# Patient Record
Sex: FEMALE | Race: BLACK OR AFRICAN AMERICAN | Employment: OTHER | URBAN - METROPOLITAN AREA
[De-identification: names, ages, dates, MRNs, and addresses within clinical notes are randomized per-mention and may not be internally consistent; named-entity substitution may affect disease eponyms.]

---

## 2021-06-10 ENCOUNTER — APPOINTMENT (OUTPATIENT)
Dept: CT IMAGING | Age: 36
DRG: 897 | End: 2021-06-10
Payer: MEDICARE

## 2021-06-10 ENCOUNTER — HOSPITAL ENCOUNTER (INPATIENT)
Age: 36
LOS: 2 days | Discharge: PSYCHIATRIC OR SUBSTANCE USE FACILITY | DRG: 897 | End: 2021-06-12
Attending: EMERGENCY MEDICINE | Admitting: HOSPITALIST
Payer: MEDICARE

## 2021-06-10 ENCOUNTER — APPOINTMENT (OUTPATIENT)
Dept: GENERAL RADIOLOGY | Age: 36
DRG: 897 | End: 2021-06-10
Payer: MEDICARE

## 2021-06-10 DIAGNOSIS — F10.920 ACUTE ALCOHOLIC INTOXICATION WITHOUT COMPLICATION (HCC): Primary | ICD-10-CM

## 2021-06-10 DIAGNOSIS — F20.9 SCHIZOPHRENIA, UNSPECIFIED TYPE (HCC): ICD-10-CM

## 2021-06-10 DIAGNOSIS — N39.0 URINARY TRACT INFECTION WITHOUT HEMATURIA, SITE UNSPECIFIED: ICD-10-CM

## 2021-06-10 PROBLEM — F10.930 ALCOHOL WITHDRAWAL, UNCOMPLICATED (HCC): Status: ACTIVE | Noted: 2021-06-10

## 2021-06-10 LAB
A/G RATIO: 1.2 (ref 1.1–2.2)
ACETAMINOPHEN LEVEL: <5 UG/ML (ref 10–30)
ALBUMIN SERPL-MCNC: 4.1 G/DL (ref 3.4–5)
ALP BLD-CCNC: 67 U/L (ref 40–129)
ALT SERPL-CCNC: 13 U/L (ref 10–40)
AMPHETAMINE SCREEN, URINE: ABNORMAL
ANION GAP SERPL CALCULATED.3IONS-SCNC: 10 MMOL/L (ref 3–16)
AST SERPL-CCNC: 24 U/L (ref 15–37)
BARBITURATE SCREEN URINE: ABNORMAL
BASE EXCESS ARTERIAL: -1.5 MMOL/L (ref -3–3)
BASOPHILS ABSOLUTE: 0.1 K/UL (ref 0–0.2)
BASOPHILS RELATIVE PERCENT: 1 %
BENZODIAZEPINE SCREEN, URINE: ABNORMAL
BILIRUB SERPL-MCNC: <0.2 MG/DL (ref 0–1)
BILIRUBIN URINE: NEGATIVE
BLOOD, URINE: NEGATIVE
BUN BLDV-MCNC: 13 MG/DL (ref 7–20)
CALCIUM SERPL-MCNC: 8.9 MG/DL (ref 8.3–10.6)
CANNABINOID SCREEN URINE: POSITIVE
CARBOXYHEMOGLOBIN ARTERIAL: 1 % (ref 0–1.5)
CHLORIDE BLD-SCNC: 104 MMOL/L (ref 99–110)
CLARITY: ABNORMAL
CO2: 25 MMOL/L (ref 21–32)
COCAINE METABOLITE SCREEN URINE: ABNORMAL
COLOR: YELLOW
CREAT SERPL-MCNC: 0.8 MG/DL (ref 0.6–1.1)
EKG ATRIAL RATE: 98 BPM
EKG DIAGNOSIS: NORMAL
EKG P AXIS: 62 DEGREES
EKG P-R INTERVAL: 184 MS
EKG Q-T INTERVAL: 370 MS
EKG QRS DURATION: 84 MS
EKG QTC CALCULATION (BAZETT): 472 MS
EKG R AXIS: 52 DEGREES
EKG T AXIS: 52 DEGREES
EKG VENTRICULAR RATE: 98 BPM
EOSINOPHILS ABSOLUTE: 0.2 K/UL (ref 0–0.6)
EOSINOPHILS RELATIVE PERCENT: 3.6 %
EPITHELIAL CELLS, UA: 5 /HPF (ref 0–5)
ETHANOL: 101 MG/DL (ref 0–0.08)
ETHANOL: 170 MG/DL (ref 0–0.08)
ETHANOL: 273 MG/DL (ref 0–0.08)
ETHANOL: 45 MG/DL (ref 0–0.08)
GFR AFRICAN AMERICAN: >60
GFR NON-AFRICAN AMERICAN: >60
GLOBULIN: 3.4 G/DL
GLUCOSE BLD-MCNC: 106 MG/DL (ref 70–99)
GLUCOSE URINE: NEGATIVE MG/DL
HCG QUALITATIVE: NEGATIVE
HCO3 ARTERIAL: 24.2 MMOL/L (ref 21–29)
HCT VFR BLD CALC: 41.7 % (ref 36–48)
HEMOGLOBIN, ART, EXTENDED: 12.9 G/DL (ref 12–16)
HEMOGLOBIN: 13.9 G/DL (ref 12–16)
HYALINE CASTS: 2 /LPF (ref 0–8)
KETONES, URINE: NEGATIVE MG/DL
LEUKOCYTE ESTERASE, URINE: ABNORMAL
LYMPHOCYTES ABSOLUTE: 3.1 K/UL (ref 1–5.1)
LYMPHOCYTES RELATIVE PERCENT: 55.6 %
Lab: ABNORMAL
MCH RBC QN AUTO: 27.9 PG (ref 26–34)
MCHC RBC AUTO-ENTMCNC: 33.4 G/DL (ref 31–36)
MCV RBC AUTO: 83.5 FL (ref 80–100)
METHADONE SCREEN, URINE: ABNORMAL
METHEMOGLOBIN ARTERIAL: 0.3 %
MICROSCOPIC EXAMINATION: YES
MONOCYTES ABSOLUTE: 0.5 K/UL (ref 0–1.3)
MONOCYTES RELATIVE PERCENT: 8.2 %
NEUTROPHILS ABSOLUTE: 1.7 K/UL (ref 1.7–7.7)
NEUTROPHILS RELATIVE PERCENT: 31.6 %
NITRITE, URINE: NEGATIVE
O2 CONTENT ARTERIAL: 17 ML/DL
O2 SAT, ARTERIAL: 96.5 %
O2 THERAPY: NORMAL
OPIATE SCREEN URINE: ABNORMAL
OXYCODONE URINE: ABNORMAL
PCO2 ARTERIAL: 43.2 MMHG (ref 35–45)
PDW BLD-RTO: 14 % (ref 12.4–15.4)
PH ARTERIAL: 7.36 (ref 7.35–7.45)
PH UA: 5.5
PH UA: 5.5 (ref 5–8)
PHENCYCLIDINE SCREEN URINE: ABNORMAL
PLATELET # BLD: 236 K/UL (ref 135–450)
PMV BLD AUTO: 8.3 FL (ref 5–10.5)
PO2 ARTERIAL: 91.3 MMHG (ref 75–108)
POTASSIUM REFLEX MAGNESIUM: 4 MMOL/L (ref 3.5–5.1)
PROPOXYPHENE SCREEN: ABNORMAL
PROTEIN UA: NEGATIVE MG/DL
RBC # BLD: 4.99 M/UL (ref 4–5.2)
RBC UA: 1 /HPF (ref 0–4)
SALICYLATE, SERUM: <0.3 MG/DL (ref 15–30)
SARS-COV-2, NAAT: NOT DETECTED
SODIUM BLD-SCNC: 139 MMOL/L (ref 136–145)
SPECIFIC GRAVITY UA: 1.01 (ref 1–1.03)
TCO2 ARTERIAL: 57.1 MMOL/L
TOTAL PROTEIN: 7.5 G/DL (ref 6.4–8.2)
TROPONIN: <0.01 NG/ML
URINE REFLEX TO CULTURE: YES
URINE TYPE: ABNORMAL
UROBILINOGEN, URINE: 0.2 E.U./DL
WBC # BLD: 5.5 K/UL (ref 4–11)
WBC UA: 47 /HPF (ref 0–5)

## 2021-06-10 PROCEDURE — 85025 COMPLETE CBC W/AUTO DIFF WBC: CPT

## 2021-06-10 PROCEDURE — 6370000000 HC RX 637 (ALT 250 FOR IP): Performed by: EMERGENCY MEDICINE

## 2021-06-10 PROCEDURE — 80053 COMPREHEN METABOLIC PANEL: CPT

## 2021-06-10 PROCEDURE — 93005 ELECTROCARDIOGRAM TRACING: CPT | Performed by: PHYSICIAN ASSISTANT

## 2021-06-10 PROCEDURE — 84484 ASSAY OF TROPONIN QUANT: CPT

## 2021-06-10 PROCEDURE — 81001 URINALYSIS AUTO W/SCOPE: CPT

## 2021-06-10 PROCEDURE — 2580000003 HC RX 258: Performed by: EMERGENCY MEDICINE

## 2021-06-10 PROCEDURE — 87635 SARS-COV-2 COVID-19 AMP PRB: CPT

## 2021-06-10 PROCEDURE — 80143 DRUG ASSAY ACETAMINOPHEN: CPT

## 2021-06-10 PROCEDURE — 2500000003 HC RX 250 WO HCPCS: Performed by: EMERGENCY MEDICINE

## 2021-06-10 PROCEDURE — 82803 BLOOD GASES ANY COMBINATION: CPT

## 2021-06-10 PROCEDURE — 80179 DRUG ASSAY SALICYLATE: CPT

## 2021-06-10 PROCEDURE — 96366 THER/PROPH/DIAG IV INF ADDON: CPT

## 2021-06-10 PROCEDURE — 2060000000 HC ICU INTERMEDIATE R&B

## 2021-06-10 PROCEDURE — 6360000002 HC RX W HCPCS: Performed by: PHYSICIAN ASSISTANT

## 2021-06-10 PROCEDURE — 36600 WITHDRAWAL OF ARTERIAL BLOOD: CPT

## 2021-06-10 PROCEDURE — 6370000000 HC RX 637 (ALT 250 FOR IP): Performed by: STUDENT IN AN ORGANIZED HEALTH CARE EDUCATION/TRAINING PROGRAM

## 2021-06-10 PROCEDURE — 96365 THER/PROPH/DIAG IV INF INIT: CPT

## 2021-06-10 PROCEDURE — 36415 COLL VENOUS BLD VENIPUNCTURE: CPT

## 2021-06-10 PROCEDURE — 2500000003 HC RX 250 WO HCPCS: Performed by: PHYSICIAN ASSISTANT

## 2021-06-10 PROCEDURE — 70450 CT HEAD/BRAIN W/O DYE: CPT

## 2021-06-10 PROCEDURE — 80307 DRUG TEST PRSMV CHEM ANLYZR: CPT

## 2021-06-10 PROCEDURE — 84703 CHORIONIC GONADOTROPIN ASSAY: CPT

## 2021-06-10 PROCEDURE — 82077 ASSAY SPEC XCP UR&BREATH IA: CPT

## 2021-06-10 PROCEDURE — 6360000002 HC RX W HCPCS: Performed by: STUDENT IN AN ORGANIZED HEALTH CARE EDUCATION/TRAINING PROGRAM

## 2021-06-10 PROCEDURE — 99285 EMERGENCY DEPT VISIT HI MDM: CPT

## 2021-06-10 PROCEDURE — 93010 ELECTROCARDIOGRAM REPORT: CPT | Performed by: INTERNAL MEDICINE

## 2021-06-10 PROCEDURE — 71045 X-RAY EXAM CHEST 1 VIEW: CPT

## 2021-06-10 PROCEDURE — 87086 URINE CULTURE/COLONY COUNT: CPT

## 2021-06-10 PROCEDURE — 96375 TX/PRO/DX INJ NEW DRUG ADDON: CPT

## 2021-06-10 PROCEDURE — 6360000002 HC RX W HCPCS: Performed by: EMERGENCY MEDICINE

## 2021-06-10 PROCEDURE — 2580000003 HC RX 258: Performed by: PHYSICIAN ASSISTANT

## 2021-06-10 RX ORDER — IBUPROFEN 800 MG/1
800 TABLET ORAL EVERY 8 HOURS PRN
Status: DISCONTINUED | OUTPATIENT
Start: 2021-06-10 | End: 2021-06-12 | Stop reason: HOSPADM

## 2021-06-10 RX ORDER — SODIUM CHLORIDE 0.9 % (FLUSH) 0.9 %
5-40 SYRINGE (ML) INJECTION PRN
Status: DISCONTINUED | OUTPATIENT
Start: 2021-06-10 | End: 2021-06-12 | Stop reason: HOSPADM

## 2021-06-10 RX ORDER — LORAZEPAM 1 MG/1
4 TABLET ORAL
Status: DISCONTINUED | OUTPATIENT
Start: 2021-06-10 | End: 2021-06-12 | Stop reason: HOSPADM

## 2021-06-10 RX ORDER — CEPHALEXIN 500 MG/1
500 CAPSULE ORAL 4 TIMES DAILY
Qty: 12 CAPSULE | Refills: 0 | Status: SHIPPED | OUTPATIENT
Start: 2021-06-10 | End: 2021-06-12 | Stop reason: HOSPADM

## 2021-06-10 RX ORDER — PANTOPRAZOLE SODIUM 40 MG/1
40 TABLET, DELAYED RELEASE ORAL
Status: DISCONTINUED | OUTPATIENT
Start: 2021-06-11 | End: 2021-06-12 | Stop reason: HOSPADM

## 2021-06-10 RX ORDER — CEPHALEXIN 250 MG/1
500 CAPSULE ORAL ONCE
Status: COMPLETED | OUTPATIENT
Start: 2021-06-10 | End: 2021-06-10

## 2021-06-10 RX ORDER — LORAZEPAM 1 MG/1
1 TABLET ORAL
Status: DISCONTINUED | OUTPATIENT
Start: 2021-06-10 | End: 2021-06-12 | Stop reason: HOSPADM

## 2021-06-10 RX ORDER — LORAZEPAM 1 MG/1
2 TABLET ORAL
Status: DISCONTINUED | OUTPATIENT
Start: 2021-06-10 | End: 2021-06-12 | Stop reason: HOSPADM

## 2021-06-10 RX ORDER — HALOPERIDOL 5 MG/ML
10 INJECTION INTRAMUSCULAR ONCE
Status: DISCONTINUED | OUTPATIENT
Start: 2021-06-10 | End: 2021-06-10

## 2021-06-10 RX ORDER — ONDANSETRON 2 MG/ML
4 INJECTION INTRAMUSCULAR; INTRAVENOUS ONCE
Status: COMPLETED | OUTPATIENT
Start: 2021-06-10 | End: 2021-06-10

## 2021-06-10 RX ORDER — LORAZEPAM 2 MG/ML
2 INJECTION INTRAMUSCULAR ONCE
Status: COMPLETED | OUTPATIENT
Start: 2021-06-10 | End: 2021-06-10

## 2021-06-10 RX ORDER — LORAZEPAM 2 MG/ML
2 INJECTION INTRAMUSCULAR
Status: DISCONTINUED | OUTPATIENT
Start: 2021-06-10 | End: 2021-06-12 | Stop reason: HOSPADM

## 2021-06-10 RX ORDER — SODIUM CHLORIDE 0.9 % (FLUSH) 0.9 %
5-40 SYRINGE (ML) INJECTION EVERY 12 HOURS SCHEDULED
Status: DISCONTINUED | OUTPATIENT
Start: 2021-06-10 | End: 2021-06-12 | Stop reason: HOSPADM

## 2021-06-10 RX ORDER — ACETAMINOPHEN 325 MG/1
650 TABLET ORAL EVERY 6 HOURS PRN
Status: DISCONTINUED | OUTPATIENT
Start: 2021-06-10 | End: 2021-06-12 | Stop reason: HOSPADM

## 2021-06-10 RX ORDER — FERROUS SULFATE 325(65) MG
325 TABLET ORAL 2 TIMES DAILY
Status: DISCONTINUED | OUTPATIENT
Start: 2021-06-10 | End: 2021-06-12 | Stop reason: HOSPADM

## 2021-06-10 RX ORDER — SODIUM CHLORIDE 9 MG/ML
25 INJECTION, SOLUTION INTRAVENOUS PRN
Status: DISCONTINUED | OUTPATIENT
Start: 2021-06-10 | End: 2021-06-12 | Stop reason: HOSPADM

## 2021-06-10 RX ORDER — 0.9 % SODIUM CHLORIDE 0.9 %
1000 INTRAVENOUS SOLUTION INTRAVENOUS ONCE
Status: COMPLETED | OUTPATIENT
Start: 2021-06-10 | End: 2021-06-10

## 2021-06-10 RX ORDER — ONDANSETRON 2 MG/ML
4 INJECTION INTRAMUSCULAR; INTRAVENOUS EVERY 6 HOURS PRN
Status: DISCONTINUED | OUTPATIENT
Start: 2021-06-10 | End: 2021-06-12 | Stop reason: HOSPADM

## 2021-06-10 RX ORDER — PROCHLORPERAZINE EDISYLATE 5 MG/ML
10 INJECTION INTRAMUSCULAR; INTRAVENOUS ONCE
Status: COMPLETED | OUTPATIENT
Start: 2021-06-10 | End: 2021-06-10

## 2021-06-10 RX ORDER — ACETAMINOPHEN 325 MG/1
650 TABLET ORAL ONCE
Status: COMPLETED | OUTPATIENT
Start: 2021-06-10 | End: 2021-06-10

## 2021-06-10 RX ORDER — POLYETHYLENE GLYCOL 3350 17 G/17G
17 POWDER, FOR SOLUTION ORAL DAILY PRN
Status: DISCONTINUED | OUTPATIENT
Start: 2021-06-10 | End: 2021-06-12 | Stop reason: HOSPADM

## 2021-06-10 RX ORDER — ACETAMINOPHEN 650 MG/1
650 SUPPOSITORY RECTAL EVERY 6 HOURS PRN
Status: DISCONTINUED | OUTPATIENT
Start: 2021-06-10 | End: 2021-06-12 | Stop reason: HOSPADM

## 2021-06-10 RX ORDER — LORAZEPAM 2 MG/ML
3 INJECTION INTRAMUSCULAR
Status: DISCONTINUED | OUTPATIENT
Start: 2021-06-10 | End: 2021-06-12 | Stop reason: HOSPADM

## 2021-06-10 RX ORDER — LORAZEPAM 2 MG/ML
1 INJECTION INTRAMUSCULAR
Status: DISCONTINUED | OUTPATIENT
Start: 2021-06-10 | End: 2021-06-12 | Stop reason: HOSPADM

## 2021-06-10 RX ORDER — LORAZEPAM 2 MG/ML
4 INJECTION INTRAMUSCULAR
Status: DISCONTINUED | OUTPATIENT
Start: 2021-06-10 | End: 2021-06-12 | Stop reason: HOSPADM

## 2021-06-10 RX ORDER — KETOROLAC TROMETHAMINE 30 MG/ML
15 INJECTION, SOLUTION INTRAMUSCULAR; INTRAVENOUS EVERY 6 HOURS PRN
Status: DISCONTINUED | OUTPATIENT
Start: 2021-06-10 | End: 2021-06-12 | Stop reason: HOSPADM

## 2021-06-10 RX ORDER — ONDANSETRON 4 MG/1
4 TABLET, ORALLY DISINTEGRATING ORAL EVERY 8 HOURS PRN
Status: DISCONTINUED | OUTPATIENT
Start: 2021-06-10 | End: 2021-06-12 | Stop reason: HOSPADM

## 2021-06-10 RX ORDER — DIPHENHYDRAMINE HYDROCHLORIDE 50 MG/ML
25 INJECTION INTRAMUSCULAR; INTRAVENOUS ONCE
Status: COMPLETED | OUTPATIENT
Start: 2021-06-10 | End: 2021-06-10

## 2021-06-10 RX ORDER — LORAZEPAM 1 MG/1
3 TABLET ORAL
Status: DISCONTINUED | OUTPATIENT
Start: 2021-06-10 | End: 2021-06-12 | Stop reason: HOSPADM

## 2021-06-10 RX ADMIN — ACETAMINOPHEN 650 MG: 325 TABLET ORAL at 08:30

## 2021-06-10 RX ADMIN — DIPHENHYDRAMINE HYDROCHLORIDE 25 MG: 50 INJECTION, SOLUTION INTRAMUSCULAR; INTRAVENOUS at 17:53

## 2021-06-10 RX ADMIN — SODIUM CHLORIDE 1000 ML: 9 INJECTION, SOLUTION INTRAVENOUS at 07:13

## 2021-06-10 RX ADMIN — CEPHALEXIN 500 MG: 250 CAPSULE ORAL at 07:06

## 2021-06-10 RX ADMIN — FOLIC ACID: 5 INJECTION, SOLUTION INTRAMUSCULAR; INTRAVENOUS; SUBCUTANEOUS at 03:22

## 2021-06-10 RX ADMIN — LORAZEPAM 2 MG: 2 INJECTION INTRAMUSCULAR; INTRAVENOUS at 17:52

## 2021-06-10 RX ADMIN — FOLIC ACID: 5 INJECTION, SOLUTION INTRAMUSCULAR; INTRAVENOUS; SUBCUTANEOUS at 18:35

## 2021-06-10 RX ADMIN — PROCHLORPERAZINE EDISYLATE 10 MG: 5 INJECTION INTRAMUSCULAR; INTRAVENOUS at 17:53

## 2021-06-10 RX ADMIN — ONDANSETRON 4 MG: 2 INJECTION INTRAMUSCULAR; INTRAVENOUS at 09:42

## 2021-06-10 ASSESSMENT — PAIN SCALES - GENERAL
PAINLEVEL_OUTOF10: 10
PAINLEVEL_OUTOF10: 0

## 2021-06-10 NOTE — ED PROVIDER NOTES
Attending addendum. Patient's had intractable nausea and vomiting she has been here for greater than 17 hours has had continued alcohol laboratory testing and she continues to have intractable nausea vomiting diarrhea. No signs of severe sepsis differential diagnosis includes alcoholic gastritis and enteritis versus possible early alcohol withdrawal, she was given additional antiemetic therapy benzodiazepines, she continues to have pressured speech and manic periods. She will be admitted to the hospital for medical management of this and upon medical clearance will be transfer for psychiatric stabilization.       Impression: Intractable nausea vomiting, alcohol intoxication, bipolar janny     Bridgette Porter MD  02/40/89 8101

## 2021-06-10 NOTE — ED PROVIDER NOTES
[address]       Current Discharge Medication List      CONTINUE these medications which have NOT CHANGED    Details   ibuprofen (ADVIL;MOTRIN) 800 MG tablet Take 1 tablet by mouth every 8 hours as needed for Pain for up to 20 doses. Qty: 20 tablet, Refills: 0      ferrous sulfate (HOLLIE-LORY) 325 (65 FE) MG tablet Take 1 tablet by mouth 2 times daily. Qty: 30 tablet, Refills: 0    Associated Diagnoses: Dysfunctional uterine bleeding; Anemia; Irregular menses      medroxyPROGESTERone (DEPO-PROVERA) 150 MG/ML injection Every 3 months, Last dose given on 6/18/2013, Do not dispense medication to patient, this is for discharge summary documentation. Will obtain from OBGYN on follow up  Qty: 1 mL, Refills: 0      acetaminophen (TYLENOL) 325 MG tablet Take 1 tablet by mouth every 6 hours as needed for Pain. Qty: 12 tablet, Refills: 0      naproxen (NAPROSYN) 500 MG tablet Take 1 tablet by mouth 2 times daily for 20 doses. Qty: 20 tablet, Refills: 0      medroxyPROGESTERone (PROVERA) 5 MG tablet Take 1 tablet by mouth daily for 10 days. Qty: 10 tablet, Refills: 0    Associated Diagnoses: Dysfunctional uterine bleeding      tranexamic acid (LYSTEDA) 650 MG TABS tablet Take 2 tablets by mouth 3 times daily for 5 days. Qty: 30 tablet, Refills: 0      ergocalciferol (ERGOCALCIFEROL) 14159 UNITS capsule Take 1 capsule by mouth once a week for 120 days. Weekly for four months  Qty: 4 capsule, Refills: 3    Associated Diagnoses: Vitamin D deficiency               ALLERGIES     Patient has no known allergies.     FAMILYHISTORY       Family History   Problem Relation Age of Onset    High Blood Pressure Mother     High Blood Pressure Sister     Rheum Arthritis Neg Hx     Osteoarthritis Neg Hx     Asthma Neg Hx     Breast Cancer Neg Hx     Cancer Neg Hx     Diabetes Neg Hx     Heart Failure Neg Hx     High Cholesterol Neg Hx     Hypertension Neg Hx     Migraines Neg Hx     Ovarian Cancer Neg Hx     Rashes/Skin Problems Neg Hx     Seizures Neg Hx     Stroke Neg Hx     Thyroid Disease Neg Hx           SOCIAL HISTORY       Social History     Tobacco Use    Smoking status: Current Every Day Smoker     Packs/day: 0.50     Years: 3.00     Pack years: 1.50     Types: Cigarettes    Smokeless tobacco: Never Used    Tobacco comment: smoking cessation 12/31/13, smoking cess 9/14   Vaping Use    Vaping Use: Never used   Substance Use Topics    Alcohol use: No    Drug use: No       SCREENINGS    [place] Coma Scale  Eye Opening: Spontaneous  Best Verbal Response: Oriented  Best Motor Response: Obeys commands  [place] Coma Scale Score: 15        PHYSICAL EXAM    (up to 7 for level 4, 8 or more for level 5)     ED Triage Vitals [06/10/21 0028]   BP Temp Temp Source Pulse Resp SpO2 Height Weight   (!) 140/89 97.1 °F (36.2 °C) Oral 96 14 97 % 5' 7\" (1.702 m) 183 lb (83 kg)       Physical Exam  Vitals and nursing note reviewed. Constitutional:       Appearance: Normal appearance. She is well-developed. She is not ill-appearing, toxic-appearing or diaphoretic. HENT:      Head: Normocephalic and atraumatic. Nose: Nose normal.      Mouth/Throat:      Mouth: Mucous membranes are moist.      Pharynx: Oropharynx is clear. Eyes:      General:         Right eye: No discharge. Left eye: No discharge. Conjunctiva/sclera: Conjunctivae normal.      Pupils: Pupils are equal, round, and reactive to light. Cardiovascular:      Rate and Rhythm: Normal rate and regular rhythm. Heart sounds: Normal heart sounds. No murmur heard. No gallop. Pulmonary:      Effort: Pulmonary effort is normal. No respiratory distress. Breath sounds: Normal breath sounds. No wheezing, rhonchi or rales. Abdominal:      General: Bowel sounds are normal.      Tenderness: There is no abdominal tenderness. There is no guarding or rebound. Musculoskeletal:         General: Normal range of motion.       Cervical back: Normal range of motion and neck supple. Skin:     General: Skin is warm and dry. Capillary Refill: Capillary refill takes less than 2 seconds. Coloration: Skin is not pale. Neurological:      Mental Status: She is alert. GCS: GCS eye subscore is 2. GCS verbal subscore is 1. GCS motor subscore is 5.    Psychiatric:         Mood and Affect: Mood normal.         Behavior: Behavior normal.         DIAGNOSTIC RESULTS   LABS:    Labs Reviewed   COMPREHENSIVE METABOLIC PANEL W/ REFLEX TO MG FOR LOW K - Abnormal; Notable for the following components:       Result Value    Glucose 106 (*)     All other components within normal limits    Narrative:     Performed at:  [facility]  [address], [place]   Phone [phone]   SALICYLATE LEVEL - Abnormal; Notable for the following components:    Salicylate, Serum <4.0 (*)     All other components within normal limits    Narrative:     Performed at:  [facility]  [address], [place]   Phone [phone]   ACETAMINOPHEN LEVEL - Abnormal; Notable for the following components:    Acetaminophen Level <5 (*)     All other components within normal limits    Narrative:     Performed at:  [facility]  [address],  [place], [place]   Phone [phone]   URINE RT REFLEX TO CULTURE - Abnormal; Notable for the following components:    Clarity, UA CLOUDY (*)     Leukocyte Esterase, Urine LARGE (*)     All other components within normal limits    Narrative:     Performed at:  [facility]  [address] [facility],  [place], [place]   Phone [phone]   [address] - Abnormal; Notable for the following components:    Cannabinoid Scrn, Ur POSITIVE (*)     All other components within normal limits    Narrative:     Performed at:  [facility]  [address] [facility], [place]   Phone [phone]   MICROSCOPIC URINALYSIS - Abnormal; Notable for the following components:    WBC, UA 47 (*)     All other components within normal limits    Narrative:     Performed at:  [facility] [address],  [place]   Phone [phone], [place]    Narrative:     Performed at:  [facility] [address]  [place]   Phone [phone]   CULTURE, URINE   CBC WITH AUTO DIFFERENTIAL    Narrative:     Performed at:  [facility] [address]  [place]   Phone [phone]   TROPONIN    Narrative:     Performed at:  [facility] [address]   Phone [phone]   ETHANOL    Narrative:     Performed at:  [facility] [address]   Phone [phone]   BLOOD GAS, ARTERIAL    Narrative:     Performed at:  [facility] [address]  [place]   Phone [phone]   HCG, SERUM, QUALITATIVE    Narrative:     Performed at:  [facility] [address]  [place]   Phone [phone]   ETHANOL    Narrative:     Performed at:  [facility] [address]  [place]   Phone [phone]   ETHANOL    Narrative:     Performed at:  [facility] [address]  [place]   Phone [phone]   ETHANOL    Narrative:     Performed at:  [facility] [address]   Phone [phone]   BASIC METABOLIC PANEL W/ REFLEX TO MG FOR LOW K   CBC       All other labs were within normal range or not returned as of this dictation. EKG:  All EKG's are interpreted by the Emergency Department Physician in the absence of a cardiologist.  Please see their note for interpretation of EKG. RADIOLOGY:   Non-plain film images such as CT, Ultrasound and MRI are read by the radiologist. Plain radiographic images are visualized and preliminarily interpreted by the ED Provider with the below findings:        Interpretation per the Radiologist below, if available at the time of this note:    XR CHEST PORTABLE   Final Result   No acute cardiopulmonary process. CT Head WO Contrast   Final Result   No acute intracranial abnormality. MRI may be obtained if clinically   indicated. CT Head WO Contrast    Result Date: 6/10/2021  EXAMINATION: CT OF THE HEAD WITHOUT CONTRAST  6/10/2021 2:22 am TECHNIQUE: CT of the head was performed without the administration of intravenous contrast. Dose modulation, iterative reconstruction, and/or weight based adjustment of the mA/kV was utilized to reduce the radiation dose to as low as reasonably achievable. COMPARISON: None. HISTORY: ORDERING SYSTEM PROVIDED HISTORY: altered TECHNOLOGIST PROVIDED HISTORY: Reason for exam:->altered Has a \"code stroke\" or \"stroke alert\" been called? ->No Decision Support Exception - unselect if not a suspected or confirmed emergency medical condition->Emergency Medical Condition (MA) Is the patient pregnant?->No Reason for Exam: Hypertension (Patient not taking meds to control HTN) Acuity: Acute Type of Exam: Initial Relevant Medical/Surgical History: Hypertension (Patient not taking meds to control HTN) FINDINGS: BRAIN/VENTRICLES: There is no acute intracranial hemorrhage, mass effect or midline shift. No abnormal extra-axial fluid collection. The gray-white differentiation is maintained without evidence of an acute infarct. There is no evidence of hydrocephalus. ORBITS: The visualized portion of the orbits demonstrate no acute abnormality. SINUSES: The visualized paranasal sinuses and mastoid air cells demonstrate no acute abnormality.  SOFT TISSUES/SKULL:  No acute abnormality of the visualized skull or soft tissues. No acute intracranial abnormality. MRI may be obtained if clinically indicated.            PROCEDURES   Unless otherwise noted below, none     Procedures    CRITICAL CARE TIME   N/A    CONSULTS:  IP CONSULT TO SOCIAL WORK      EMERGENCY DEPARTMENT COURSE and DIFFERENTIAL DIAGNOSIS/MDM:   Vitals:    Vitals:    06/10/21 1757 06/10/21 1800 06/10/21 1830 06/10/21 1915   BP:  (!) 154/102 132/82 (!) 142/103   Pulse:  113 116 94   Resp:  19 20 20   Temp: 99.8 °F (37.7 °C)   98.3 °F (36.8 °C)   TempSrc: Oral   Temporal   SpO2:  96% 92% 97%   Weight:    186 lb 9.6 oz (84.6 kg)   Height:           Patient was given the following medications:  Medications   ferrous sulfate (IRON 325) tablet 325 mg (0 mg Oral Held 6/10/21 2005)   ibuprofen (ADVIL;MOTRIN) tablet 800 mg (has no administration in time range)   sodium chloride flush 0.9 % injection 5-40 mL (has no administration in time range)   sodium chloride flush 0.9 % injection 5-40 mL (has no administration in time range)   0.9 % sodium chloride infusion (has no administration in time range)   enoxaparin (LOVENOX) injection 40 mg (has no administration in time range)   ondansetron (ZOFRAN-ODT) disintegrating tablet 4 mg (has no administration in time range)     Or   ondansetron (ZOFRAN) injection 4 mg (has no administration in time range)   polyethylene glycol (GLYCOLAX) packet 17 g (has no administration in time range)   acetaminophen (TYLENOL) tablet 650 mg (has no administration in time range)     Or   acetaminophen (TYLENOL) suppository 650 mg (has no administration in time range)   LORazepam (ATIVAN) tablet 1 mg (has no administration in time range)     Or   LORazepam (ATIVAN) injection 1 mg (has no administration in time range)     Or   LORazepam (ATIVAN) tablet 2 mg (has no administration in time range)     Or   LORazepam (ATIVAN) injection 2 mg (has no administration in time range) Or   LORazepam (ATIVAN) tablet 3 mg (has no administration in time range)     Or   LORazepam (ATIVAN) injection 3 mg (has no administration in time range)     Or   LORazepam (ATIVAN) tablet 4 mg (has no administration in time range)     Or   LORazepam (ATIVAN) injection 4 mg (has no administration in time range)   pantoprazole (PROTONIX) tablet 40 mg (has no administration in time range)   ondansetron (ZOFRAN) injection 4 mg (has no administration in time range)   ketorolac (TORADOL) injection 15 mg (has no administration in time range)   sodium chloride 0.9 % 1,035 mL with folic acid 1 mg, adult multi-vitamin with vitamin k 10 mL, thiamine 300 mg ( Intravenous Stopped 6/10/21 0540)   0.9 % sodium chloride bolus (0 mLs Intravenous Stopped 6/10/21 0958)   cephALEXin (KEFLEX) capsule 500 mg (500 mg Oral Given 6/10/21 0706)   acetaminophen (TYLENOL) tablet 650 mg (650 mg Oral Given 6/10/21 0830)   ondansetron (ZOFRAN) injection 4 mg (4 mg Intravenous Given 6/10/21 0942)   LORazepam (ATIVAN) injection 2 mg (2 mg Intravenous Given 6/10/21 1752)   prochlorperazine (COMPAZINE) injection 10 mg (10 mg Intravenous Given 6/10/21 1753)   diphenhydrAMINE (BENADRYL) injection 25 mg (25 mg Intravenous Given 6/10/21 1753)   sodium chloride 0.9 % 1,464 mL with folic acid 1 mg, adult multi-vitamin with vitamin k 10 mL, thiamine 300 mg ( Intravenous New Bag 6/10/21 1835)           Patient presents emergency department for evaluation of alcohol intoxication and possible suicidal ideation. Patient arrives with pink slip in place from police and all information on this pink slip was obtained from family members. Patient reacts to painful stimuli but does not stay awake long enough to speak with this provider. Patient's ethanol is 273. Patient is started on banana bag. CT of her head shows no acute intracranial process. Laboratory evaluation is otherwise unremarkable. Drug screen is pending.   Patient will be observed here in the emergency department until she is sober and is able to answer questions regarding her mental status. Has this is the enema shift care be turned over to my attending physician. Please see his note for final disposition and care. FINAL IMPRESSION      1. Acute alcoholic intoxication without complication (HCC)    2. Schizophrenia, unspecified type ([facility] [address]    3. Urinary tract infection without hematuria, site unspecified          DISPOSITION/PLAN   DISPOSITION        PATIENT REFERRED TO:  No follow-up provider specified.     DISCHARGE MEDICATIONS:  Current Discharge Medication List      START taking these medications    Details   cephALEXin (KEFLEX) 500 MG capsule Take 1 capsule by mouth 4 times daily for 3 days  Qty: 12 capsule, Refills: 0             DISCONTINUED MEDICATIONS:  Current Discharge Medication List                 (Please note that portions of this note were completed with a voice recognition program.  Efforts were made to edit the dictations but occasionally words are mis-transcribed.)    Lennis Romberg, PA-C (electronically signed)            Lennis Romberg, PA-C  06/10/21 [address] [facility] MARIA TERESA Bess  [facility]/10/21 [facility]  06/10/21 6375

## 2021-06-10 NOTE — ED NOTES
Bed: 15  Expected date:   Expected time:   Means of arrival:   Comments:  zohreh Allred RN  06/10/21 0020

## 2021-06-10 NOTE — H&P
HOSPITALISTS HISTORY AND PHYSICAL    6/10/2021 5:46 PM    Patient Information:  Aliza Sal is a 28 y.o. female [id number]  PCP:  No primary care provider on file. (Tel: None )    Chief complaint:    Chief Complaint   Patient presents with    Hypertension     Patient not taking meds to control HTN    Alcohol Intoxication     Family call EMS        History of Present Illness:  Sis Palm is a 28 y.o. female who presented with initially was admitted in the ER this morning with concern for hypertension alcohol intoxication and concern for possible suicidal ideation. Patient was intoxicated when she said patient wanted to kill herself. Family member brought her to the ER. Patient was initially placed on cycle was supposed to go to inpatient psych while waiting. Patient started having nausea vomiting and some concern for possible withdrawal.  Patient would not tell me anything how much she drinks. Patient very minimally interactive. Patient complains is nonspecific symptoms included some nausea vomiting headache. Patient with history of hypertension has not taken his medication. REVIEW OF SYSTEMS:   Constitutional: Negative for fever,chills or night sweats  ENT: Negative for rhinorrhea, epistaxis, hoarseness, sore throat. Respiratory: Negative for shortness of breath,wheezing  Cardiovascular: Negative for chest pain, palpitations   Gastrointestinal: Negative for nausea, vomiting, diarrhea  Genitourinary: Negative for polyuria, dysuria   Hematologic/Lymphatic: Negative for bleeding tendency, easy bruising  Musculoskeletal: Negative for myalgias and arthralgias  Neurologic: Negative for confusion,dysarthria. Skin: Negative for itching,rash, good capillary refill. Psychiatric: Negative for depression,anxiety, agitation. Endocrine: Negative for polydipsia,polyuria,heat /cold intolerance.     Past Medical History:   has a past medical history of Anemia, appearance:  Appears comfortable. Well nourished  Eyes: Sclera clear, pupils equal  ENT: Moist mucus membranes, no thrush. Trachea midline. Cardiovascular: Regular rhythm, normal S1, S2. No murmur, gallop, rub. No edema in lower extremities  Respiratory: Clear to auscultation bilaterally, no wheeze, good inspiratory effort  Gastrointestinal: Abdomen soft, non-tender, not distended, normal bowel sounds  Musculoskeletal: No cyanosis in digits, neck supple  Neurology: Cranial nerves grossly intact. Alert and oriented in time, place and person. No speech or motor deficits  Psychiatry: Appropriate affect. Not agitated  Skin: Warm, dry, normal turgor, no rash    Labs:  CBC:   Lab Results   Component Value Date    WBC 5.5 06/10/2021    RBC 4.99 06/10/2021    HGB 13.9 06/10/2021    HCT 41.7 06/10/2021    MCV 83.5 06/10/2021    MCH 27.9 06/10/2021    MCHC 33.4 06/10/2021    RDW 14.0 06/10/2021     06/10/2021    MPV 8.3 06/10/2021     BMP:    Lab Results   Component Value Date     06/10/2021    K 4.0 06/10/2021     06/10/2021    CO2 25 06/10/2021    BUN 13 06/10/2021    CREATININE 0.8 06/10/2021    CALCIUM 8.9 06/10/2021    GFRAA >60 06/10/2021    LABGLOM >60 06/10/2021    GLUCOSE 106 06/10/2021       Chest Xray:   EKG:    I visualized CXR images and EKG strips     Discussed  with      Problem List  Active Problems:    Alcohol withdrawal, uncomplicated (HCC)  Resolved Problems:    * No resolved hospital problems.  *        Assessment/Plan:   Alcohol intoxication  -With possible concern for early withdrawal versus gastritis  -Patient initially was supposed to go to inpatient psych due to suicidal ideation but now with symptoms of nausea vomiting and some headache [place] arrest to admit for further evaluation and possible rule out withdrawals before they can accept her  -We will admit her for further evaluation IV Protonix,  -We will place on CIWA protocol sepsis stop withdrawing although unclear how much she drinks    Gastritis  -PPI supportive care IV fluids    Suicidal ideation  -While intoxicated.   -Inpatient psych once we clear her medically  - patient has been pink slipped    Hypertension   unclear which medications he takes at home.  -Blood pressure is acceptable so we will hold off on any medication right now    Abnormal UA with WBC and elevated leuk esterase.  -Patient is denying any urinary symptoms so we will hold off on any treatment until urine cultures come back            Kelly Ceja MD    6/10/2021 5:46 PM

## 2021-06-10 NOTE — ED PROVIDER NOTES
I independently performed a history and physical on Clifton Hodgson. All diagnostic, treatment, and disposition decisions were made by myself in conjunction with the advanced practice provider. Briefly, this is a 28 y.o. female here for abnormal behavior. History of schizophrenia. EMS called by family after patient was drinking alcohol tonight, family states the patient was stating that she wanted to kill herself. Family also states the patient has not been taking any of her medications. Patient was placed on a psychiatric hold by police. On exam, patient afebrile and nontoxic. No distress. Heart tachycardic, regular rhythm. Lungs CTAB. Abdomen soft, nondistended, no distress elicited with deep palpation all quadrants. Somnolent, briefly opens eyes to sternal rub, will squeeze my fingers however does not answer questions or follow commands. Briskly withdraws all extremities to noxious stimuli. EKG  EKG was reviewed by emergency department physician in the absence of a cardiologist    Narrow complex sinus rhythm, rate 98, normal axis, normal TN and QRS intervals, normal Qtc, no ST elevations or depressions, normal t-wave morphology, impression NSR, no STEMI      Screenings   [place] Coma Scale  Eye Opening: Spontaneous  Best Verbal Response: Oriented  Best Motor Response: Obeys commands  [place] Coma Scale Score: 15        MDM    Patient afebrile and nontoxic. She is in no distress. EKG is without evidence of acute ischemia or malignant dysrhythmia. CT head shows no hemorrhage or mass-effect. Patient obtunded on presentation, however moves all extremities and there is no observable focal deficit, very low suspicion for CVA/TIA. Her respirations are even, she is not hypoxic and she is protecting her airway. Tox work-up remarkable for EtOH of 273 and UDS positive for marijuana. UA with suspected UTI and will give oral keflex when patient can tolerate. She is not septic.      On reevaluation at 0630 patient is much more alert, she is conversant and follows commands. A repeat neurological exam remained without any focal deficits. Patient does state to me that the month is February and the year is 2006. Her thought process is tangential with very labile affect. She is noncombative. Will order repeat EtOH, however I suspect patient likely has underlying decompensated psychiatric illness. She does deny to me at this time any suicidal ideation. Will need re-evaluation at sobriety. As time of my end of shift case was discussed with Dr. Jonatan Mata who will assume care at 0700. Patient Referrals:  No follow-up provider specified. Discharge Medications:  New Prescriptions    No medications on file       FINAL IMPRESSION  1. Acute alcoholic intoxication without complication (HCC)    2. Schizophrenia, unspecified type ([facility] [address]        Blood pressure (!) 120/91, pulse 105, temperature 97.1 °F (36.2 °C), temperature source Oral, resp. rate 16, height 5' 7\" (1.702 m), weight 183 lb (83 kg), SpO2 98 %. For further details of [facility] emergency department encounter, please see documentation by advanced practice provider, MARIELA Almonte.     Marlene Higuera DO (electronically signed)  Attending Emergency Physician       Marlene Higuera DO  06/10/21 [address] Claudio DO  06/10/21 1806

## 2021-06-10 NOTE — ED PROVIDER NOTES
Emergency Department Encounter  Location: [facility] [place] [place]    Patient: Denys Guerra  MRN: [id number]  : 1985  Date of evaluation: 6/10/2021  ED Provider: Juanito Mcclelland MD      Denys Guerra was checked out to me by Dr. Rosalba Quiñones 8:24 AM Please see his/her initial documentation for details of the patient's initial ED presentation, physical exam and completed studies. In brief, Denys Guerra is a 28 y.o. female that presented to the emergency department intoxicated with tangential thinking and SI. Hx schizophrenia non compliant with antipsychotic medications.      I have reviewed and interpreted all of the currently available lab results and diagnostics from this visit:    Labs  Results for orders placed or performed during the hospital encounter of 06/10/21   COVID-19, Rapid    Specimen: Nasopharyngeal Swab   Result Value Ref Range    SARS-CoV-2, NAAT Not Detected Not Detected   CBC Auto Differential   Result Value Ref Range    WBC 5.5 4.0 - 11.0 K/uL    RBC 4.99 4.00 - 5.20 M/uL    Hemoglobin 13.9 12.0 - 16.0 g/dL    Hematocrit 41.7 36.0 - 48.0 %    MCV 83.5 80.0 - 100.0 fL    MCH 27.9 26.0 - 34.0 pg    MCHC 33.4 31.0 - 36.0 g/dL    RDW 14.0 12.4 - 15.4 %    Platelets 818 153 - 242 K/uL    MPV 8.3 5.0 - 10.5 fL    Neutrophils % 31.6 %    Lymphocytes % 55.6 %    Monocytes % 8.2 %    Eosinophils % 3.6 %    Basophils % 1.0 %    Neutrophils Absolute 1.7 1.7 - 7.7 K/uL    Lymphocytes Absolute 3.1 1.0 - 5.1 K/uL    Monocytes Absolute 0.5 0.0 - 1.3 K/uL    Eosinophils Absolute 0.2 0.0 - 0.6 K/uL    Basophils Absolute 0.1 0.0 - 0.2 K/uL   Comprehensive Metabolic Panel w/ Reflex to MG   Result Value Ref Range    Sodium 139 136 - 145 mmol/L    Potassium reflex Magnesium 4.0 3.5 - 5.1 mmol/L    Chloride 104 99 - 110 mmol/L    CO2 25 21 - 32 mmol/L    Anion Gap 10 3 - 16    Glucose 106 (H) 70 - 99 mg/dL    BUN 13 7 - 20 mg/dL    CREATININE 0.8 0.6 - 1.1 mg/dL    GFR Non-African American >60 >60    GFR African American >60 >60    Calcium 8.9 8.3 - 10.6 mg/dL    Total Protein 7.5 6.4 - 8.2 g/dL    Albumin 4.1 3.4 - 5.0 g/dL    Albumin/Globulin Ratio 1.2 1.1 - 2.2    Total Bilirubin <0.2 0.0 - 1.0 mg/dL    Alkaline Phosphatase 67 40 - 129 U/L    ALT 13 10 - 40 U/L    AST 24 15 - 37 U/L    Globulin 3.4 g/dL   Troponin   Result Value Ref Range    Troponin <0.01 <0.01 ng/mL   Ethanol   Result Value Ref Range    Ethanol Lvl 787 mg/dL   Salicylate   Result Value Ref Range    Salicylate, Serum <0.1 (L) 15.0 - 30.0 mg/dL   Acetaminophen Level   Result Value Ref Range    Acetaminophen Level <5 (L) 10 - 30 ug/mL   Urinalysis Reflex to Culture    Specimen: Urine, clean catch   Result Value Ref Range    Color, UA YELLOW Straw/Yellow    Clarity, UA CLOUDY (A) Clear    Glucose, Ur Negative Negative mg/dL    Bilirubin Urine Negative Negative    Ketones, Urine Negative Negative mg/dL    Specific Gravity, UA 1.009 1.005 - 1.030    Blood, Urine Negative Negative    pH, UA 5.5 5.0 - 8.0    Protein, UA Negative Negative mg/dL    Urobilinogen, Urine 0.2 <2.0 E.U./dL    Nitrite, Urine Negative Negative    Leukocyte Esterase, Urine LARGE (A) Negative    Microscopic Examination YES     Urine Type NotGiven     Urine Reflex to Culture Yes    Urine Drug Screen   Result Value Ref Range    Amphetamine Screen, Urine Neg Negative <1000ng/mL    Barbiturate Screen, Ur Neg Negative <200 ng/mL    Benzodiazepine Screen, Urine Neg Negative <200 ng/mL    Cannabinoid Scrn, Ur POSITIVE (A) Negative <50 ng/mL    Cocaine Metabolite Screen, Urine Neg Negative <300 ng/mL    Opiate Scrn, Ur Neg Negative <300 ng/mL    PCP Screen, Urine Neg Negative <25 ng/mL    Methadone Screen, Urine Neg Negative <300 ng/mL    Propoxyphene Scrn, Ur Neg Negative <300 ng/mL    Oxycodone Urine Neg Negative <100 ng/ml    pH, UA 5.5     Drug Screen Comment: see below    Blood gas, arterial   Result Value Ref Range    pH, Arterial 7.356 7.350 - 7.450    pCO2, Arterial 43.2 35.0 - 45.0 mmHg    pO2, Arterial 91.3 75.0 - 108.0 mmHg    HCO3, Arterial 24.2 21.0 - 29.0 mmol/L    Base Excess, Arterial -1.5 -3.0 - 3.0 mmol/L    Hemoglobin, Art, Extended 12.9 12.0 - 16.0 g/dL    O2 Sat, Arterial 96.5 >92 %    Carboxyhgb, Arterial 1.0 0.0 - 1.5 %    Methemoglobin, Arterial 0.3 <1.5 %    TCO2, Arterial 57.1 Not Established mmol/L    O2 Content, Arterial 17 Not Established mL/dL    O2 Therapy Unknown    HCG Qualitative, Serum   Result Value Ref Range    hCG Qual Negative Detects HCG level >10 MIU/mL   Microscopic Urinalysis   Result Value Ref Range    Hyaline Casts, UA 2 0 - 8 /LPF    WBC, UA 47 (H) 0 - 5 /HPF    RBC, UA 1 0 - 4 /HPF    Epithelial Cells, UA 5 0 - 5 /HPF   EKG 12 Lead   Result Value Ref Range    Ventricular Rate 98 BPM    Atrial Rate 98 BPM    P-R Interval 184 ms    QRS Duration 84 ms    Q-T Interval 370 ms    QTc Calculation (Bazett) 472 ms    P Axis 62 degrees    R Axis 52 degrees    T Axis 52 degrees    Diagnosis Normal sinus rhythmNormal ECG        Imaging  XR CHEST PORTABLE   Final Result   No acute cardiopulmonary process. CT Head WO Contrast   Final Result   No acute intracranial abnormality. MRI may be obtained if clinically   indicated. MDM and ED Course  Patient has a history of schizophrenia and medication noncompliance presenting to the ER last night intoxicated after drinking alcohol. She endorsed SI to EMS. Alcohol level was over 250. She was found to have a UTI treated with Keflex. Initially hypertensive on arrival in the setting of medication noncompliance however blood pressure has normalized in the emergency room without significant intervention. On my repeat evaluation this morning she is medically cleared but still exhibiting erratic behavior and tangential thinking. Her speech is pressured. She is denying SI. I am concerned for a manic episode. I believe she warrants further psychiatric evaluation.  72-hour hold was signed and completed by me. Pt signed out to Dr. Mikel Juares pending psychiatric accepting facility. Final Impression  1. Acute alcoholic intoxication without complication (HCC)    2. Schizophrenia, unspecified type ([facility] [address]        Blood pressure (!) 120/91, pulse 105, temperature 97.1 °F (36.2 °C), temperature source Oral, resp. rate 16, height 5' 7\" (1.702 m), weight 183 lb (83 kg), SpO2 98 %. Disposition:  DISPOSITION Ed Observation 06/10/2021 03:31:45 AM      Patient Referrals:  No follow-up provider specified. Discharge Medications:  New Prescriptions    No medications on file          [employer]    This chart was generated using the [address] [place] [address] [address] dictation system. I created this record but it may contain dictation errors given the limitations of this technology.         Fer Munguia MD  06/10/21 1148

## 2021-06-10 NOTE — ED NOTES
Pt up vomiting in sink, then urinates on floor as this nurse told her she was getting her a bedside commonde. BM in commode then more vomiting. Pt is asking for morphine for headache.   Reminded patient that she had tylenol 2 hours ago and rolls over and falls asleep     Joycelyn Alonzo RN  06/10/21 7386

## 2021-06-10 NOTE — ED NOTES
Tried to call report but stated just got assignment and doesn't  Have a nurse to take     Mariza Trevino RN  06/10/21 4216

## 2021-06-11 LAB
ANION GAP SERPL CALCULATED.3IONS-SCNC: 10 MMOL/L (ref 3–16)
BUN BLDV-MCNC: 9 MG/DL (ref 7–20)
CALCIUM SERPL-MCNC: 8.5 MG/DL (ref 8.3–10.6)
CHLORIDE BLD-SCNC: 101 MMOL/L (ref 99–110)
CHOLESTEROL, FASTING: 146 MG/DL (ref 0–199)
CO2: 24 MMOL/L (ref 21–32)
CREAT SERPL-MCNC: 0.7 MG/DL (ref 0.6–1.1)
FOLATE: >20 NG/ML (ref 4.78–24.2)
GFR AFRICAN AMERICAN: >60
GFR NON-AFRICAN AMERICAN: >60
GLUCOSE BLD-MCNC: 112 MG/DL (ref 70–99)
HCG(URINE) PREGNANCY TEST: NEGATIVE
HCT VFR BLD CALC: 34.9 % (ref 36–48)
HDLC SERPL-MCNC: 48 MG/DL (ref 40–60)
HEMOGLOBIN: 11.5 G/DL (ref 12–16)
LDL CHOLESTEROL CALCULATED: 66 MG/DL
MAGNESIUM: 1.7 MG/DL (ref 1.8–2.4)
MCH RBC QN AUTO: 27.3 PG (ref 26–34)
MCHC RBC AUTO-ENTMCNC: 32.9 G/DL (ref 31–36)
MCV RBC AUTO: 83 FL (ref 80–100)
PDW BLD-RTO: 13.6 % (ref 12.4–15.4)
PLATELET # BLD: 212 K/UL (ref 135–450)
PMV BLD AUTO: 8 FL (ref 5–10.5)
POTASSIUM REFLEX MAGNESIUM: 3.5 MMOL/L (ref 3.5–5.1)
RBC # BLD: 4.2 M/UL (ref 4–5.2)
SODIUM BLD-SCNC: 135 MMOL/L (ref 136–145)
TRIGLYCERIDE, FASTING: 161 MG/DL (ref 0–150)
TSH REFLEX: 2.43 UIU/ML (ref 0.27–4.2)
URINE CULTURE, ROUTINE: NORMAL
VITAMIN B-12: 514 PG/ML (ref 211–911)
VLDLC SERPL CALC-MCNC: 32 MG/DL
WBC # BLD: 6.8 K/UL (ref 4–11)

## 2021-06-11 PROCEDURE — 84703 CHORIONIC GONADOTROPIN ASSAY: CPT

## 2021-06-11 PROCEDURE — 82607 VITAMIN B-12: CPT

## 2021-06-11 PROCEDURE — 83036 HEMOGLOBIN GLYCOSYLATED A1C: CPT

## 2021-06-11 PROCEDURE — 6370000000 HC RX 637 (ALT 250 FOR IP): Performed by: PSYCHIATRY & NEUROLOGY

## 2021-06-11 PROCEDURE — 6360000002 HC RX W HCPCS: Performed by: NURSE PRACTITIONER

## 2021-06-11 PROCEDURE — 2060000000 HC ICU INTERMEDIATE R&B

## 2021-06-11 PROCEDURE — 6360000002 HC RX W HCPCS: Performed by: HOSPITALIST

## 2021-06-11 PROCEDURE — 80048 BASIC METABOLIC PNL TOTAL CA: CPT

## 2021-06-11 PROCEDURE — 84443 ASSAY THYROID STIM HORMONE: CPT

## 2021-06-11 PROCEDURE — 2580000003 HC RX 258: Performed by: HOSPITALIST

## 2021-06-11 PROCEDURE — 83735 ASSAY OF MAGNESIUM: CPT

## 2021-06-11 PROCEDURE — 6370000000 HC RX 637 (ALT 250 FOR IP): Performed by: NURSE PRACTITIONER

## 2021-06-11 PROCEDURE — 82746 ASSAY OF FOLIC ACID SERUM: CPT

## 2021-06-11 PROCEDURE — 36415 COLL VENOUS BLD VENIPUNCTURE: CPT

## 2021-06-11 PROCEDURE — 99221 1ST HOSP IP/OBS SF/LOW 40: CPT | Performed by: PSYCHIATRY & NEUROLOGY

## 2021-06-11 PROCEDURE — 6370000000 HC RX 637 (ALT 250 FOR IP): Performed by: HOSPITALIST

## 2021-06-11 PROCEDURE — 85027 COMPLETE CBC AUTOMATED: CPT

## 2021-06-11 PROCEDURE — 80061 LIPID PANEL: CPT

## 2021-06-11 RX ORDER — ARIPIPRAZOLE 5 MG/1
10 TABLET ORAL DAILY
Status: DISCONTINUED | OUTPATIENT
Start: 2021-06-11 | End: 2021-06-12 | Stop reason: HOSPADM

## 2021-06-11 RX ORDER — CLONIDINE HYDROCHLORIDE 0.1 MG/1
0.1 TABLET ORAL 2 TIMES DAILY
Status: DISCONTINUED | OUTPATIENT
Start: 2021-06-11 | End: 2021-06-11

## 2021-06-11 RX ORDER — CLONIDINE HYDROCHLORIDE 0.1 MG/1
0.1 TABLET ORAL 3 TIMES DAILY
Status: DISCONTINUED | OUTPATIENT
Start: 2021-06-11 | End: 2021-06-12 | Stop reason: HOSPADM

## 2021-06-11 RX ORDER — MAGNESIUM SULFATE IN WATER 40 MG/ML
2000 INJECTION, SOLUTION INTRAVENOUS ONCE
Status: COMPLETED | OUTPATIENT
Start: 2021-06-11 | End: 2021-06-11

## 2021-06-11 RX ADMIN — METOPROLOL TARTRATE 25 MG: 25 TABLET, FILM COATED ORAL at 20:40

## 2021-06-11 RX ADMIN — PANTOPRAZOLE SODIUM 40 MG: 40 TABLET, DELAYED RELEASE ORAL at 09:39

## 2021-06-11 RX ADMIN — Medication 10 ML: at 20:41

## 2021-06-11 RX ADMIN — CLONIDINE HYDROCHLORIDE 0.1 MG: 0.1 TABLET ORAL at 12:38

## 2021-06-11 RX ADMIN — ARIPIPRAZOLE 10 MG: 5 TABLET ORAL at 11:40

## 2021-06-11 RX ADMIN — METOPROLOL TARTRATE 25 MG: 25 TABLET, FILM COATED ORAL at 12:38

## 2021-06-11 RX ADMIN — ENOXAPARIN SODIUM 40 MG: 40 INJECTION SUBCUTANEOUS at 09:38

## 2021-06-11 RX ADMIN — CLONIDINE HYDROCHLORIDE 0.1 MG: 0.1 TABLET ORAL at 09:39

## 2021-06-11 RX ADMIN — FERROUS SULFATE TAB 325 MG (65 MG ELEMENTAL FE) 325 MG: 325 (65 FE) TAB at 20:40

## 2021-06-11 RX ADMIN — CLONIDINE HYDROCHLORIDE 0.1 MG: 0.1 TABLET ORAL at 20:40

## 2021-06-11 RX ADMIN — IBUPROFEN 800 MG: 800 TABLET, FILM COATED ORAL at 15:18

## 2021-06-11 RX ADMIN — MAGNESIUM SULFATE HEPTAHYDRATE 2000 MG: 40 INJECTION, SOLUTION INTRAVENOUS at 09:47

## 2021-06-11 RX ADMIN — Medication 10 ML: at 09:47

## 2021-06-11 RX ADMIN — KETOROLAC TROMETHAMINE 15 MG: 30 INJECTION, SOLUTION INTRAMUSCULAR; INTRAVENOUS at 20:42

## 2021-06-11 RX ADMIN — KETOROLAC TROMETHAMINE 15 MG: 30 INJECTION, SOLUTION INTRAMUSCULAR; INTRAVENOUS at 11:40

## 2021-06-11 RX ADMIN — FERROUS SULFATE TAB 325 MG (65 MG ELEMENTAL FE) 325 MG: 325 (65 FE) TAB at 09:39

## 2021-06-11 ASSESSMENT — PAIN SCALES - GENERAL
PAINLEVEL_OUTOF10: 8
PAINLEVEL_OUTOF10: 8
PAINLEVEL_OUTOF10: 0
PAINLEVEL_OUTOF10: 8
PAINLEVEL_OUTOF10: 7
PAINLEVEL_OUTOF10: 8

## 2021-06-11 ASSESSMENT — PAIN DESCRIPTION - ORIENTATION: ORIENTATION: LOWER;MID;UPPER

## 2021-06-11 ASSESSMENT — PAIN DESCRIPTION - LOCATION
LOCATION: BACK
LOCATION: BACK

## 2021-06-11 ASSESSMENT — PAIN DESCRIPTION - PAIN TYPE
TYPE: CHRONIC PAIN
TYPE: CHRONIC PAIN

## 2021-06-11 NOTE — PROGRESS NOTES
[facility] PROGRESS NOTE    6/11/2021 7:57 AM        Name: Felipe Paulson . Admitted: 6/10/2021  Primary Care Provider: No primary care provider on file. (Tel: None)      Subjective:  . Admitted with alcohol intoxication and concern for possible suicidal ideation   PT seen this am with sitter at bedside  Reports limited memory of the events from yesterday    States she was bored and drank 2 shots of \"clear\" and then woke up in the hospital  Reports that she has been in etoh rehab 2 - 3 times. ..   Was previously at [address] [place] [address]   Ate 100% of breakfast. No current nausea or vomiting     Reviewed interval ancillary notes    Current Medications  ferrous sulfate (IRON 325) tablet 325 mg, BID  ibuprofen (ADVIL;MOTRIN) tablet 800 mg, Q8H PRN  sodium chloride flush 0.9 % injection 5-40 mL, 2 times per day  sodium chloride flush 0.9 % injection 5-40 mL, PRN  0.9 % sodium chloride infusion, PRN  enoxaparin (LOVENOX) injection 40 mg, Daily  ondansetron (ZOFRAN-ODT) disintegrating tablet 4 mg, Q8H PRN   Or  ondansetron (ZOFRAN) injection 4 mg, Q6H PRN  polyethylene glycol (GLYCOLAX) packet 17 g, Daily PRN  acetaminophen (TYLENOL) tablet 650 mg, Q6H PRN   Or  acetaminophen (TYLENOL) suppository 650 mg, Q6H PRN  LORazepam (ATIVAN) tablet 1 mg, Q1H PRN   Or  LORazepam (ATIVAN) injection 1 mg, Q1H PRN   Or  LORazepam (ATIVAN) tablet 2 mg, Q1H PRN   Or  LORazepam (ATIVAN) injection 2 mg, Q1H PRN   Or  LORazepam (ATIVAN) tablet 3 mg, Q1H PRN   Or  LORazepam (ATIVAN) injection 3 mg, Q1H PRN   Or  LORazepam (ATIVAN) tablet 4 mg, Q1H PRN   Or  LORazepam (ATIVAN) injection 4 mg, Q1H PRN  pantoprazole (PROTONIX) tablet 40 mg, QAM AC  ondansetron (ZOFRAN) injection 4 mg, Q6H PRN  ketorolac (TORADOL) injection 15 mg, Q6H PRN        Objective:  BP (!) 130/91   Pulse 87   Temp 98 °F (36.7 °C) (Axillary)   Resp 19   Ht 5' 7\" (1.702 m)   Wt 186 lb 9.6 oz (84.6 kg)   SpO2 94%   BMI 29.23 kg/m²   No intake or output data in the 24 hours ending 06/11/21 0757   Wt Readings from Last 3 Encounters:   06/10/21 186 lb 9.6 oz (84.6 kg)   09/02/14 164 lb 4 oz (74.5 kg)   08/20/14 161 lb 3.2 oz (73.1 kg)       General appearance:  Appears comfortable,  Not currently anxious. She is drowsy at intervals   Eyes: Sclera clear. Pupils equal.  ENT: Moist oral mucosa. Trachea midline, no adenopathy. Cardiovascular: Regular rhythm, normal S1, S2. No murmur. No edema in lower extremities  Respiratory: Not using accessory muscles. Good inspiratory effort. Clear to auscultation bilaterally, no wheeze or crackles. GI: Abdomen soft, no tenderness, not distended, normal bowel sounds  Musculoskeletal: No cyanosis in digits, neck supple  Neurology: CN 2-12 grossly intact. No speech or motor deficits  Psych: flat affect, frequently drifts off to sleep. Oriented to name, date and surroundings. Skin: Warm, dry, normal turgor    Labs and Tests:  CBC:   Recent Labs     06/10/21  0232 06/11/21  0430   WBC 5.5 6.8   HGB 13.9 11.5*    212     BMP:    Recent Labs     06/10/21  0232 06/11/21  0430    135*   K 4.0 3.5    101   CO2 25 24   BUN 13 9   CREATININE 0.8 0.7   GLUCOSE 106* 112*     Hepatic:   Recent Labs     06/10/21  0232   AST 24   ALT 13   BILITOT <0.2   ALKPHOS 67         Problem List  Active Problems:    Alcohol withdrawal, uncomplicated (HCC)  Resolved Problems:    * No resolved hospital problems. *       Assessment & Plan:   1. Alcohol withdrawal:  It is unclear how much she drinks. .. reports that she drinks only 2 shots per day. She is not currently tremulous etc.  Continue with CIWA and vitamin replacement therapy   2. Depression/ possible suicidal ideation:  She has been evaluated by psychiatry and needs inpatient psych when medically stable. Anticipate d/c to inpatient psych when nausea and vomiting resolved   3.  HTN:  BP is elevated. I added clonidine and low dose BB. Will follow closely  4. Tobacco use: will offer nicotine patch   5. Likely stable in the next 24 hours for d/c to psych. SW updated       Diet: ADULT DIET;  Regular; Safety Tray; Safety Tray (Disposables)  Code:Full Code  DVT PPX      DORCAS Mack CNP   6/11/2021 7:57 AM

## 2021-06-11 NOTE — FLOWSHEET NOTE
Ivanan Arechiga regarding pt increased bp. Pt resting quietly in bed, sitter at bedside. No s/s of withdrawal at this time.

## 2021-06-11 NOTE — CARE COORDINATION
Patient is currently being evaluated for psychiatric recommendations. If Inpatient treatment is recommended, Please order a Rapid Covid, and document when patient is medically stable and ready for discharge and call [phone] ([phone]).   Thank you

## 2021-06-11 NOTE — PLAN OF CARE
Problem: Suicide risk  Goal: Provide patient with safe environment  Description: Provide patient with safe environment  Outcome: Ongoing  Note: Pt in suicide precautions sitter has been in place t/o shift     Problem: Pain:  Goal: Pain level will decrease  Description: Pain level will decrease  Outcome: Ongoing  Note: Pt has toradol and ibuprofen ordered prn for chronic back pain. Pain meds have been successful at managing pain t/o shift.  Pt aware of pain med schedule and pain scale

## 2021-06-11 NOTE — CARE COORDINATION
Met with patient to provide a list of Addiction Treatment resources, T. [phone] and [facility] @ [address], [phone]. Patient should call him/herself for a pre-screen. Social Service will follow up, depending on his insurance, and bed availability. Patient may prefer an Intensive outpatient program, such as [facility], [address] [place] [address].

## 2021-06-11 NOTE — CONSULTS
PSYCHIATRY CONSULT, INITIAL EVALUATION    Referring Provider:  Jm Martinez MD    CC/Reason for Consult: suicidal ideation      ASSESSMENT:   27 yo F with alcohol intox, possible withdrawal and manic symptoms and SI. She does appear manic and psychotic, mood is unstable enough for her to have had recent SI and family concern for her safety. Certainly there could be some influence from marijuana and alcohol, but seems largely the main concern is her psychosis and janny. It is unclear how physically dependent she is on alcohol and what her actual pattern of alcohol use is. 1. Bipolar disorder type I, janny with with psychotic features r/o schizoaffective disorder bipolar type  2. Alcohol abuse    RECOMMENDATIONS:   1. Will start aripiprazole 10mg daily. Discussed r/b/se with patient  2. Check urine preg, TSH, B12, folate, baseline fasting lipid and HgA1c   3. May need addiction treatment in the future when more stable with mood disorder    Dispo: inpatient patient psych when medically stable  Safety: RF include mood disorder, anxiety, rational thought loss, substance abuse. Pt at this time represents a potential imminent safety risk and requires inpatient psychiatric admission for safety. Thank you for this consult, please call the psychiatry consult line for further questions. ____________________________________________________________________________    HPI:   context[de-identified] 27 yo F with unclear psych hx (both bipolar d/o 1 and schizophrenia listed per chart), hx of alcohol abuse, presented to the hospital with suicidal ideation in the setting of alcohol intoxication. Family called police with concern and she arrived here on a psych hold. While observed in the ED there was concern she was manic. She also had a lot of nausea and vomiting so she was admitted for medical clearance. associated symptoms:   Pt's thought process is disjointed and tangential so is hard to follow.  She states she is here b/c she said she was suicidal and was intoxicated but she doesn't recall what happened prior to her coming. She states she had 2 shots, 3 beers. She believes that she is going to be getting a large sum of money from the government for work she is doing for them. She lists of variety of random things she believes she is doing for the government like arts and crafts, presentations, shows and various other non-specific things. She mentions multiple people being out in society getting blessed and that she needs to be blessed as well but couldn't elaborate on this much. She denies hallucinations but states see believes creepy people are watching her from around corners. States she has not slept much over the last 30 days. Denies she is suicidal.     modifying factors:   She was prescribed medications last 7-8 months after during an inpatient psych hospitalization in [facility] but did not maintain compliance with them. Timing: acute on chronic  duration: 1 month  severity: severe    ROS:   Gen: no fevers or chills  HEENT: no vision or hearing probs  CV: no cp  Resp: no dyspnea  : no dysuria  MSK:  No muscle or joint pain  GI:  No n/v/d  Skin: no rashes  Neuro: no tremors  Endo: no weight changes    Past Psychiatric History:    Hosp: last in [place] 7-8 months ago. Reports two psych hospitalizations in her time visiting family down there   Diagnoses: per patient she's been dx with \"mood swingers\" and anxiety. Per chart in 2013 and 2014 had diagnoses listed for schizophrenia and bipolar disorder   Med trials: she does not recall any by name except trazodone. But she was able to recognize trials of zyprexa, geodon, risperidone, depakote, lithium, seroquel. Haldol   Outpt: none   Suicide Attempts: denies    Substance Use History:   Nicotine: denies   Alcohol: states she drinks every other day \"a couple shots\". Won't specifiy how long she has been drinking this way   Illicits: denies.  UDs +MJ    Past Medical History:   Past Medical History:   Diagnosis Date    Anemia     Dysfunctional uterine bleeding 11/26/2013    Hx of gallstones 9/2012    Hypertension     Irregular uterine bleeding      Past Surgical History:   Procedure Laterality Date    DILATION AND CURETTAGE OF UTERUS  2004       Social/Developmental History:    Relationship: single   Children: 3 children   Housing: states she lives alone, one child lives with child's father, other two she states live in a separate household    Family History:   Family History   Problem Relation Age of Onset    High Blood Pressure Mother     High Blood Pressure Sister     Rheum Arthritis Neg Hx     Osteoarthritis Neg Hx     Asthma Neg Hx     Breast Cancer Neg Hx     Cancer Neg Hx     Diabetes Neg Hx     Heart Failure Neg Hx     High Cholesterol Neg Hx     Hypertension Neg Hx     Migraines Neg Hx     Ovarian Cancer Neg Hx     Rashes/Skin Problems Neg Hx     Seizures Neg Hx     Stroke Neg Hx     Thyroid Disease Neg Hx        Allergies:  No Known Allergies    Home Medications:   No current facility-administered medications on file prior to encounter. Current Outpatient Medications on File Prior to Encounter   Medication Sig Dispense Refill    ibuprofen (ADVIL;MOTRIN) 800 MG tablet Take 1 tablet by mouth every 8 hours as needed for Pain for up to 20 doses. 20 tablet 0    ferrous sulfate (HOLLIE-LORY) 325 (65 FE) MG tablet Take 1 tablet by mouth 2 times daily. 30 tablet 0    medroxyPROGESTERone (DEPO-PROVERA) 150 MG/ML injection Every 3 months, Last dose given on 6/18/2013, Do not dispense medication to patient, this is for discharge summary documentation. Will obtain from OBGYN on follow up 1 mL 0    acetaminophen (TYLENOL) 325 MG tablet Take 1 tablet by mouth every 6 hours as needed for Pain. 12 tablet 0    naproxen (NAPROSYN) 500 MG tablet Take 1 tablet by mouth 2 times daily for 20 doses.  20 tablet 0    medroxyPROGESTERone (PROVERA) 5 MG tablet Take 1 tablet by no abrasions, no jaundice, no lesions, no pruritis, and no rashes.

## 2021-06-12 ENCOUNTER — HOSPITAL ENCOUNTER (INPATIENT)
Age: 36
LOS: 5 days | Discharge: HOME | DRG: 885 | End: 2021-06-17
Attending: PSYCHIATRY & NEUROLOGY | Admitting: PSYCHIATRY & NEUROLOGY
Payer: MEDICARE

## 2021-06-12 VITALS
HEART RATE: 68 BPM | TEMPERATURE: 98 F | OXYGEN SATURATION: 98 % | WEIGHT: 188.5 LBS | BODY MASS INDEX: 29.58 KG/M2 | DIASTOLIC BLOOD PRESSURE: 103 MMHG | SYSTOLIC BLOOD PRESSURE: 150 MMHG | HEIGHT: 67 IN | RESPIRATION RATE: 18 BRPM

## 2021-06-12 PROBLEM — F31.9 BIPOLAR DISORDER (HCC): Status: ACTIVE | Noted: 2021-06-12

## 2021-06-12 LAB
ESTIMATED AVERAGE GLUCOSE: 108.3 MG/DL
HBA1C MFR BLD: 5.4 %
SARS-COV-2, NAAT: NOT DETECTED

## 2021-06-12 PROCEDURE — 6370000000 HC RX 637 (ALT 250 FOR IP): Performed by: HOSPITALIST

## 2021-06-12 PROCEDURE — 6360000002 HC RX W HCPCS: Performed by: HOSPITALIST

## 2021-06-12 PROCEDURE — 6370000000 HC RX 637 (ALT 250 FOR IP): Performed by: PSYCHIATRY & NEUROLOGY

## 2021-06-12 PROCEDURE — 2580000003 HC RX 258: Performed by: HOSPITALIST

## 2021-06-12 PROCEDURE — 6370000000 HC RX 637 (ALT 250 FOR IP): Performed by: NURSE PRACTITIONER

## 2021-06-12 PROCEDURE — 87635 SARS-COV-2 COVID-19 AMP PRB: CPT

## 2021-06-12 PROCEDURE — 1240000000 HC EMOTIONAL WELLNESS R&B

## 2021-06-12 RX ORDER — FERROUS SULFATE 325(65) MG
325 TABLET ORAL 2 TIMES DAILY WITH MEALS
Status: DISCONTINUED | OUTPATIENT
Start: 2021-06-13 | End: 2021-06-17 | Stop reason: HOSPADM

## 2021-06-12 RX ORDER — CLONIDINE HYDROCHLORIDE 0.1 MG/1
0.1 TABLET ORAL 3 TIMES DAILY
Status: DISCONTINUED | OUTPATIENT
Start: 2021-06-12 | End: 2021-06-17 | Stop reason: HOSPADM

## 2021-06-12 RX ORDER — OLANZAPINE 10 MG/1
10 INJECTION, POWDER, LYOPHILIZED, FOR SOLUTION INTRAMUSCULAR
Status: ACTIVE | OUTPATIENT
Start: 2021-06-12 | End: 2021-06-12

## 2021-06-12 RX ORDER — TRAZODONE HYDROCHLORIDE 50 MG/1
50 TABLET ORAL NIGHTLY
Status: DISCONTINUED | OUTPATIENT
Start: 2021-06-12 | End: 2021-06-12

## 2021-06-12 RX ORDER — BENZTROPINE MESYLATE 1 MG/ML
2 INJECTION INTRAMUSCULAR; INTRAVENOUS 2 TIMES DAILY PRN
Status: DISCONTINUED | OUTPATIENT
Start: 2021-06-12 | End: 2021-06-17 | Stop reason: HOSPADM

## 2021-06-12 RX ORDER — MAGNESIUM HYDROXIDE/ALUMINUM HYDROXICE/SIMETHICONE 120; 1200; 1200 MG/30ML; MG/30ML; MG/30ML
30 SUSPENSION ORAL EVERY 6 HOURS PRN
Status: DISCONTINUED | OUTPATIENT
Start: 2021-06-12 | End: 2021-06-17 | Stop reason: HOSPADM

## 2021-06-12 RX ORDER — OLANZAPINE 10 MG/1
10 TABLET ORAL
Status: ACTIVE | OUTPATIENT
Start: 2021-06-12 | End: 2021-06-12

## 2021-06-12 RX ORDER — IBUPROFEN 400 MG/1
400 TABLET ORAL EVERY 6 HOURS PRN
Status: DISCONTINUED | OUTPATIENT
Start: 2021-06-12 | End: 2021-06-17 | Stop reason: HOSPADM

## 2021-06-12 RX ORDER — ARIPIPRAZOLE 10 MG/1
10 TABLET ORAL DAILY
Qty: 30 TABLET | Refills: 0 | Status: ON HOLD | OUTPATIENT
Start: 2021-06-13 | End: 2021-06-17 | Stop reason: HOSPADM

## 2021-06-12 RX ORDER — PANTOPRAZOLE SODIUM 40 MG/1
40 TABLET, DELAYED RELEASE ORAL
Status: DISCONTINUED | OUTPATIENT
Start: 2021-06-13 | End: 2021-06-17 | Stop reason: HOSPADM

## 2021-06-12 RX ORDER — ACETAMINOPHEN 325 MG/1
650 TABLET ORAL EVERY 4 HOURS PRN
Status: DISCONTINUED | OUTPATIENT
Start: 2021-06-12 | End: 2021-06-17 | Stop reason: HOSPADM

## 2021-06-12 RX ORDER — TRAZODONE HYDROCHLORIDE 50 MG/1
50 TABLET ORAL NIGHTLY PRN
Status: DISCONTINUED | OUTPATIENT
Start: 2021-06-12 | End: 2021-06-17 | Stop reason: HOSPADM

## 2021-06-12 RX ORDER — ARIPIPRAZOLE 10 MG/1
10 TABLET ORAL DAILY
Status: DISCONTINUED | OUTPATIENT
Start: 2021-06-13 | End: 2021-06-14

## 2021-06-12 RX ORDER — CLONIDINE HYDROCHLORIDE 0.1 MG/1
0.1 TABLET ORAL 3 TIMES DAILY
Qty: 60 TABLET | Refills: 0 | Status: ON HOLD | OUTPATIENT
Start: 2021-06-12 | End: 2021-06-17 | Stop reason: SDUPTHER

## 2021-06-12 RX ADMIN — ARIPIPRAZOLE 10 MG: 5 TABLET ORAL at 07:59

## 2021-06-12 RX ADMIN — KETOROLAC TROMETHAMINE 15 MG: 30 INJECTION, SOLUTION INTRAMUSCULAR; INTRAVENOUS at 07:59

## 2021-06-12 RX ADMIN — CLONIDINE HYDROCHLORIDE 0.1 MG: 0.1 TABLET ORAL at 07:59

## 2021-06-12 RX ADMIN — FERROUS SULFATE TAB 325 MG (65 MG ELEMENTAL FE) 325 MG: 325 (65 FE) TAB at 08:04

## 2021-06-12 RX ADMIN — TRAZODONE HYDROCHLORIDE 50 MG: 50 TABLET ORAL at 23:10

## 2021-06-12 RX ADMIN — PANTOPRAZOLE SODIUM 40 MG: 40 TABLET, DELAYED RELEASE ORAL at 05:33

## 2021-06-12 RX ADMIN — METOPROLOL TARTRATE 25 MG: 25 TABLET, FILM COATED ORAL at 07:59

## 2021-06-12 RX ADMIN — Medication 10 ML: at 08:00

## 2021-06-12 RX ADMIN — IBUPROFEN 400 MG: 400 TABLET, FILM COATED ORAL at 23:10

## 2021-06-12 RX ADMIN — ENOXAPARIN SODIUM 40 MG: 40 INJECTION SUBCUTANEOUS at 07:59

## 2021-06-12 RX ADMIN — CLONIDINE HYDROCHLORIDE 0.1 MG: 0.1 TABLET ORAL at 13:33

## 2021-06-12 ASSESSMENT — SLEEP AND FATIGUE QUESTIONNAIRES
DIFFICULTY FALLING ASLEEP: YES
DIFFICULTY STAYING ASLEEP: YES
RESTFUL SLEEP: NO
DO YOU USE A SLEEP AID: YES
DIFFICULTY ARISING: NO
SLEEP PATTERN: DIFFICULTY FALLING ASLEEP;INSOMNIA
DO YOU HAVE DIFFICULTY SLEEPING: YES
AVERAGE NUMBER OF SLEEP HOURS: 6

## 2021-06-12 ASSESSMENT — PAIN DESCRIPTION - PAIN TYPE: TYPE: CHRONIC PAIN

## 2021-06-12 ASSESSMENT — PAIN SCALES - GENERAL
PAINLEVEL_OUTOF10: 8
PAINLEVEL_OUTOF10: 5

## 2021-06-12 ASSESSMENT — LIFESTYLE VARIABLES: HISTORY_ALCOHOL_USE: NO

## 2021-06-12 ASSESSMENT — PAIN DESCRIPTION - LOCATION: LOCATION: BACK

## 2021-06-12 NOTE — BH NOTE
Pt arrives to unit oriented to room and unit. VSS, pt has a pink pistol in her belongings, security called to verify what looks like a BB gun. Security verifies that the pink pistol is indeed an unloaded BB gun, which is then labeled per protocol then locked in the safe with pt's other belongings. BB gun package is clearly marked on outside of envelope.

## 2021-06-12 NOTE — FLOWSHEET NOTE
Data- discharge order received, pt  (appointed legal authority) verbalized agreement to discharge, disposition to [facility] facility , [address] reviewed and signed by physician. Action- AVS prepared, LANCE completed/ reported faxed by case management/. Discharge instruction summary: Diet- as tolerated, Activity- as tolerated, immunizations reviewed and up to date, medications prescriptions to be filled at receiving facility except for the controlled prescriptions to be sent: n/a, Transfer code status: Full Code, LDAs to remain with discharge: n/a. Security signed valuables over to transport taking patient. Response- Bedside RN to call report to receiving facility. Pt belongings gathered, peripheral IV and cardiac monitoring removed. Disposition to Discharged via ambulance to intermediate care facility by EMS transportation, no complications reported.

## 2021-06-12 NOTE — DISCHARGE SUMMARY
[facility]    [facility] Demographics    Patient. Ruth Pratt  Date of Birth. 1985  MRN. [id number]     Primary care provider. No primary care provider on file. (Tel: None)    Admit date: 6/10/2021    Discharge date 6/12/2021  Note Date: 6/12/2021     Reason for Hospitalization. Chief Complaint   Patient presents with    Hypertension     Patient not taking meds to control HTN    Alcohol Intoxication     Family call EMS          Significant Findings. Active Problems:    Alcohol withdrawal, uncomplicated (HCC)    Bipolar affective disorder, manic, severe, with psychotic behavior ([address]  Resolved Problems:    * No resolved hospital problems. *       Problems and results from this hospitalization that need follow up. 1. bipoloar depression with suicidal ideation    Significant test results and incidental findings. 1.     Invasive procedures and treatments. [facility] Course. The patient was brought to the ED by her family members due to alcohol intoxication and suicidal ideation. She was admitted and given supportive care with IV hydration and anti emetics. She vomitied and had loose stools in the ED , however this resolved within a few hours. She was tolerating a diet at the time of d/c to in patient psych. Elevated Blood pressure:  She was treated with clonidine and low dose bb with very good results    Bi polar depression:  Abilify was added by psychiatry     COVID was negative     Consults. IP CONSULT TO SOCIAL WORK  IP CONSULT TO PSYCHIATRY    Physical examination on discharge day. /84   Pulse 75   Temp 96.9 °F (36.1 °C) (Temporal)   Resp 18   Ht 5' 7\" (1.702 m)   Wt 188 lb 8 oz (85.5 kg)   SpO2 96%   BMI 29.52 kg/m²   General appearance. Alert. Looks comfortable. HEENT. Sclera clear. Moist mucus membranes. Cardiovascular.  Regular rate and patient. Follow Up. in 1 week   Disposition. In patient psych at [facility]   Activity. As tolerated   Diet: ADULT DIET; Regular; Safety Tray; Safety Tray (Disposables)      Spent > 30  minutes in discharge process.     Signed:  DORCAS Castellano CNP     6/12/2021 7:37 AM

## 2021-06-12 NOTE — BH NOTE
Patient had a BB gun, unloaded which was locked in the safe. Security came to unit and took the BB gun to security.

## 2021-06-13 PROBLEM — F12.10 CANNABIS ABUSE: Status: ACTIVE | Noted: 2021-06-13

## 2021-06-13 PROBLEM — F10.10 ALCOHOL ABUSE: Status: ACTIVE | Noted: 2021-06-13

## 2021-06-13 LAB
CHOLESTEROL, TOTAL: 198 MG/DL (ref 0–199)
HDLC SERPL-MCNC: 59 MG/DL (ref 40–60)
LDL CHOLESTEROL CALCULATED: 82 MG/DL
TRIGL SERPL-MCNC: 283 MG/DL (ref 0–150)
TSH SERPL DL<=0.05 MIU/L-ACNC: 2.65 UIU/ML (ref 0.27–4.2)
VLDLC SERPL CALC-MCNC: 57 MG/DL

## 2021-06-13 PROCEDURE — 99223 1ST HOSP IP/OBS HIGH 75: CPT | Performed by: PSYCHIATRY & NEUROLOGY

## 2021-06-13 PROCEDURE — 99222 1ST HOSP IP/OBS MODERATE 55: CPT | Performed by: PHYSICIAN ASSISTANT

## 2021-06-13 PROCEDURE — 6370000000 HC RX 637 (ALT 250 FOR IP): Performed by: PHYSICIAN ASSISTANT

## 2021-06-13 PROCEDURE — 6370000000 HC RX 637 (ALT 250 FOR IP): Performed by: PSYCHIATRY & NEUROLOGY

## 2021-06-13 PROCEDURE — 83036 HEMOGLOBIN GLYCOSYLATED A1C: CPT

## 2021-06-13 PROCEDURE — 36415 COLL VENOUS BLD VENIPUNCTURE: CPT

## 2021-06-13 PROCEDURE — 84443 ASSAY THYROID STIM HORMONE: CPT

## 2021-06-13 PROCEDURE — 80061 LIPID PANEL: CPT

## 2021-06-13 PROCEDURE — 1240000000 HC EMOTIONAL WELLNESS R&B

## 2021-06-13 RX ORDER — HYDROCHLOROTHIAZIDE 25 MG/1
12.5 TABLET ORAL DAILY
Status: DISCONTINUED | OUTPATIENT
Start: 2021-06-13 | End: 2021-06-17 | Stop reason: HOSPADM

## 2021-06-13 RX ADMIN — IBUPROFEN 400 MG: 400 TABLET, FILM COATED ORAL at 06:29

## 2021-06-13 RX ADMIN — CLONIDINE HYDROCHLORIDE 0.1 MG: 0.1 TABLET ORAL at 08:21

## 2021-06-13 RX ADMIN — FERROUS SULFATE TAB 325 MG (65 MG ELEMENTAL FE) 325 MG: 325 (65 FE) TAB at 16:19

## 2021-06-13 RX ADMIN — CLONIDINE HYDROCHLORIDE 0.1 MG: 0.1 TABLET ORAL at 13:14

## 2021-06-13 RX ADMIN — METOPROLOL TARTRATE 25 MG: 25 TABLET, FILM COATED ORAL at 20:48

## 2021-06-13 RX ADMIN — FERROUS SULFATE TAB 325 MG (65 MG ELEMENTAL FE) 325 MG: 325 (65 FE) TAB at 08:21

## 2021-06-13 RX ADMIN — METOPROLOL TARTRATE 25 MG: 25 TABLET, FILM COATED ORAL at 01:09

## 2021-06-13 RX ADMIN — TRAZODONE HYDROCHLORIDE 50 MG: 50 TABLET ORAL at 21:01

## 2021-06-13 RX ADMIN — METOPROLOL TARTRATE 25 MG: 25 TABLET, FILM COATED ORAL at 08:21

## 2021-06-13 RX ADMIN — CLONIDINE HYDROCHLORIDE 0.1 MG: 0.1 TABLET ORAL at 20:48

## 2021-06-13 RX ADMIN — IBUPROFEN 400 MG: 400 TABLET, FILM COATED ORAL at 16:21

## 2021-06-13 RX ADMIN — HYDROCHLOROTHIAZIDE 12.5 MG: 25 TABLET ORAL at 13:14

## 2021-06-13 RX ADMIN — IBUPROFEN 400 MG: 400 TABLET, FILM COATED ORAL at 21:01

## 2021-06-13 RX ADMIN — CLONIDINE HYDROCHLORIDE 0.1 MG: 0.1 TABLET ORAL at 01:09

## 2021-06-13 RX ADMIN — ARIPIPRAZOLE 10 MG: 10 TABLET ORAL at 08:21

## 2021-06-13 RX ADMIN — PANTOPRAZOLE SODIUM 40 MG: 40 TABLET, DELAYED RELEASE ORAL at 06:27

## 2021-06-13 ASSESSMENT — SLEEP AND FATIGUE QUESTIONNAIRES
RESTFUL SLEEP: NO
DIFFICULTY STAYING ASLEEP: YES
DIFFICULTY FALLING ASLEEP: YES
DO YOU HAVE DIFFICULTY SLEEPING: YES
DIFFICULTY ARISING: NO
AVERAGE NUMBER OF SLEEP HOURS: 1
SLEEP PATTERN: INSOMNIA
DO YOU USE A SLEEP AID: NO

## 2021-06-13 ASSESSMENT — LIFESTYLE VARIABLES: HISTORY_ALCOHOL_USE: NO

## 2021-06-13 ASSESSMENT — PAIN SCALES - GENERAL
PAINLEVEL_OUTOF10: 0
PAINLEVEL_OUTOF10: 8
PAINLEVEL_OUTOF10: 9
PAINLEVEL_OUTOF10: 5
PAINLEVEL_OUTOF10: 0

## 2021-06-13 NOTE — PLAN OF CARE
Pt out visible watching tv, eating in dinning room with peers, social with select peers for short periods of time. Pt calm and cooperative with care denies SI,HI,AVH, no distress noted at this time pt calmly setting coloring and watching ball game.

## 2021-06-13 NOTE — PROGRESS NOTES
Purposeful Rounding    Patient Location Patient room    Patient willing to engage in conversationYes    Presentation/behavior Cooperative    Affect Labile    Concerns reported: none    PRN medications given: none    Environmental assessment Room free from clutter, Clear path to bathroom  and No safety hazards noted    Fall prevention interventions in place: Yellow non-skid socks on    Daily [place] Fall Risk Score : 71    Daily [place] Fall Risk Score : 0

## 2021-06-13 NOTE — PROGRESS NOTES
`[facility]  Admission Note     Admission Type:   Admission Type:  Involuntary    Reason for admission:  Reason for Admission: SI    PATIENT STRENGTHS:  Strengths: Positive Support    Patient Strengths and Limitations:  Limitations: Apathetic / unmotivated, Inappropriate/potentially harmful leisure interests, Lacks leisure interests    Addictive Behavior:   Addictive Behavior  In the past 3 months, have you felt or has someone told you that you have a problem with:  : None  Do you have a history of Chemical Use?: No  Do you have a history of Alcohol Use?: No  Do you have a history of Street Drug Abuse?: No  Histroy of Prescripton Drug Abuse?: No    Medical Problems:   Past Medical History:   Diagnosis Date    Anemia     Dysfunctional uterine bleeding 11/26/2013    Hx of gallstones 9/2012    Hypertension     Irregular uterine bleeding        Status EXAM:  Status and Exam  Normal: No  Facial Expression: Exaggerated  Affect: Incongruent  Level of Consciousness: Alert  Mood:Normal: No  Mood: Anxious, Labile  Motor Activity:Normal: Yes  Interview Behavior: Cooperative, Impulsive  Preception: [place] to Person, [place] to Time, [place] to Place  Attention:Normal: Yes  Thought Processes: Flt.of Ideas  Hallucinations: None  Delusions: No  Memory:Normal: No  Memory: Poor Recent  Insight and Judgment: No  Insight and Judgment: Poor Judgment, Poor Insight  Present Suicidal Ideation: No  Present Homicidal Ideation: No    Tobacco Screening:  Practical Counseling, on admission, oliva X, if applicable and completed (first 3 are required if patient doesn't refuse):            ( )  Recognizing danger situations (included triggers and roadblocks)                    ( )  Coping skills (new ways to manage stress, exercise, relaxation techniques, changing routine, distraction)                                                           ( )  Basic information about quitting (benefits of quitting, techniques in how to

## 2021-06-13 NOTE — FLOWSHEET NOTE
06/13/21 1105   Psychiatric History   Psychiatric history treatment   (Pt does not remember)   Are there any medication issues? No   Support System   Support system Adequate   Types of Support System Mother;Father; Other (Comment); Friend;Sister   Problems in support system None   Current Living Situation   Home Living Adequate   Living information Lives with others  (2 sons, sister, and nephew)   Problems with living situation  No   Lack of basic needs No   SSDI/SSI SSI   Other government assistance medcaid   Problems with environment denies   Current abuse issues denies   Supervised setting None   Relationship problems No   Medical and Self-Care Issues   Relevant medical problems denies   Relevant self-care issues denies   Barriers to treatment No   Family Constellation   Spouse/partner-name/age Ex  Fate   Children-names/ages Kumar(14) Ronna Mcguire (25)  Cristino Davidson (?)(12)   Siblings 5 sisters, 3 brothers   Support services   (denies)   Childhood   Raised by Biological mother;Biological father   Biological mother Jen Kapadia father Lonza Cedric   Relevant family history no relevant family hx   History of abuse No   Legal History   Legal history No   Other relevant legal issues denies   Comment denies   Juvenile legal history No    Abuse Assessment   Physical Abuse Denies   Verbal Abuse Denies   Possible abuse reported to:   (n/a)   Emotional abuse Denies   Financial Abuse Denies   Sexual abuse Denies   Elder abuse No   Substance Use   Use of substances  No   Motivation for SA Treatment   Stage of engagement   (n/a)   Current barriers to treatment   (n/a)   Education   Education College graduate  (some college)   Special education   (denies)   Work History   Currently employed   (self employed)   Recent job loss or change   (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 AT/OT and CSSR-S risk assessment were completed ASHOK because pt was nodding off during assessment. Pt was visibly tired and speech was slurred. At times, pt would become awake again and very clear. Pt denies drug and alcohol use.

## 2021-06-13 NOTE — PROGRESS NOTES
Behavioral Services  Medicare Certification Upon Admission    I certify that this patient's inpatient psychiatric hospital admission is medically necessary for:    [x] (1) Treatment which could reasonably be expected to improve this patient's condition,       [x] (2) Or for diagnostic study;     AND     [x](2) The inpatient psychiatric services are provided while the individual is under the care of a physician and are included in the individualized plan of care.     Estimated length of stay/service 3-5 d    Plan for post-hospital care outpt\      Electronically signed by Janie Aldana MD on 6/13/2021 at 6:58 AM

## 2021-06-13 NOTE — H&P
[address]                 [address] [place] [address]                              HISTORY AND PHYSICAL    PATIENT NAME: Lisset Clark                      :        1985  MED REC NO:   [id number]                          ROOM:       2317  ACCOUNT NO:   [de-identified]                           ADMIT DATE: 2021  PROVIDER:     Samantha Garrett. Gaurang Herrera MD    DATE OF EVALUATION:  2021    CHIEF COMPLAINT:  Suicidality. HISTORY OF PRESENT ILLNESS:  A 42-year-old female with alcohol  intoxication and manic symptoms, presented to [facility] on  06/10/2021 for treatment. She was admitted. Again, was seen by Dr. Latha Hernandez MD, Psychiatry, 2021. At that time, he diagnosed  her with bipolar 1, janny with psychotic features, after she cleared  from her alcohol intoxication. Apparently, family called police with  concern and she arrived in a psychiatric hold. She appeared to be manic  in the ED, according to his report. Her thought processes reportedly  were disjointed and tangential.  She made statements that she thought  she was getting a large sum of money from the government for the work  she is doing for them, and lists a variety of random things she believes  she is doing for [employer] like arts and crafts. She also made a  mention of a report of multiple human beings out in society getting  blessed and that she needs to be blessed as well, but could not  elaborate. Apparently, she has not been sleeping well over the past 30  days. When I met with her today, it was difficult to get her to stay on topic  or to get a straight story. She stated that she was, \"shocked about  waking up here. \"  She states she was not able to give me any history  about why she was in the hospital at [address]. She showed lability of  mood, speaking in vague generalities. Thoughts appeared disconnected.    She could not recall much in the way of inpatient stay at [facility].  She then made a statement that her father was going to visit  today, and that, \"I love God, and I won't take him back. \"  She then  stated that, Opal Lopez is a good father. \"    PRIOR PSYCHIATRIC TREATMENT:  Inpatient, apparently in [facility] 7 to 8  months ago. She reported two Psychiatric Hospitalizations to Dr. Kelley Mitchell. Outpatient, none currently. Apparently, she had diagnosis of  schizophrenia or bipolar disorder in [id number]. PAST MEDICATIONS:  Include Zyprexa, Geodon, Restoril, lithium,  Seroquel, Haldol, and trazodone. Suicide attempt, she denied. SUBSTANCE USE HISTORY:  Denied cigarette, nicotine use. Drinks alcohol  every other day with a few shots. Denies any illicit drugs, although  she is positive for marijuana. MEDICAL HISTORY:  Significant for anemia, hypertension. FAMILY PSYCHIATRIC HISTORY:  None reported. DRUG ALLERGIES:  None known. CURRENT MEDICATIONS:  Ferrous sulfate 325 mg three times a day, Provera  5 mg for 10 days, Lysteda 650 mg tablet take two to three times a day  for 5 days, vitamin D.    PHYSICAL EXAMINATION:  VITAL SIGNS:  Temperature 97.5, pulse 70, respirations 16, blood  pressure 152/102. She is 188 pounds. LABORATORIES:  A urine drug screen was positive for cannabis. Initial  alcohol level was 273 at [facility]. TSH 2.65. CT of head on 06/10/2021  showed no intracranial abnormalities. LEGAL ISSUES:  None known. SOCIAL HISTORY:  Lives in [place] [address] at times. She states  she has three kids, ages 25, 15, and 15. It appears she is not taking  care of them currently. TRAUMA HISTORY:  She denied. MENTAL STATUS EXAMINATION:  The patient is a 70-year-old female. She is  well-groomed. She was relatively cooperative. She had difficulty  staying on topic. Her thoughts were somewhat disconnected. She is very  vague and had difficulty with giving the specific information.   She  denied any threats to harm

## 2021-06-14 LAB
ESTIMATED AVERAGE GLUCOSE: 108.3 MG/DL
HBA1C MFR BLD: 5.4 %

## 2021-06-14 PROCEDURE — 99233 SBSQ HOSP IP/OBS HIGH 50: CPT | Performed by: PSYCHIATRY & NEUROLOGY

## 2021-06-14 PROCEDURE — 6370000000 HC RX 637 (ALT 250 FOR IP): Performed by: PHYSICIAN ASSISTANT

## 2021-06-14 PROCEDURE — 1240000000 HC EMOTIONAL WELLNESS R&B

## 2021-06-14 PROCEDURE — 6370000000 HC RX 637 (ALT 250 FOR IP): Performed by: PSYCHIATRY & NEUROLOGY

## 2021-06-14 RX ORDER — RISPERIDONE 1 MG/1
2 TABLET, ORALLY DISINTEGRATING ORAL NIGHTLY
Status: DISCONTINUED | OUTPATIENT
Start: 2021-06-14 | End: 2021-06-15

## 2021-06-14 RX ORDER — OXCARBAZEPINE 300 MG/1
300 TABLET, FILM COATED ORAL 2 TIMES DAILY
Status: DISCONTINUED | OUTPATIENT
Start: 2021-06-14 | End: 2021-06-17 | Stop reason: HOSPADM

## 2021-06-14 RX ORDER — QUETIAPINE FUMARATE 25 MG/1
50 TABLET, FILM COATED ORAL NIGHTLY
Status: DISCONTINUED | OUTPATIENT
Start: 2021-06-14 | End: 2021-06-14

## 2021-06-14 RX ORDER — OLANZAPINE 10 MG/1
10 TABLET, ORALLY DISINTEGRATING ORAL ONCE
Status: COMPLETED | OUTPATIENT
Start: 2021-06-14 | End: 2021-06-14

## 2021-06-14 RX ADMIN — PANTOPRAZOLE SODIUM 40 MG: 40 TABLET, DELAYED RELEASE ORAL at 06:15

## 2021-06-14 RX ADMIN — METOPROLOL TARTRATE 25 MG: 25 TABLET, FILM COATED ORAL at 21:44

## 2021-06-14 RX ADMIN — CLONIDINE HYDROCHLORIDE 0.1 MG: 0.1 TABLET ORAL at 13:34

## 2021-06-14 RX ADMIN — CLONIDINE HYDROCHLORIDE 0.1 MG: 0.1 TABLET ORAL at 21:44

## 2021-06-14 RX ADMIN — RISPERIDONE 2 MG: 1 TABLET, ORALLY DISINTEGRATING ORAL at 21:44

## 2021-06-14 RX ADMIN — METOPROLOL TARTRATE 25 MG: 25 TABLET, FILM COATED ORAL at 09:25

## 2021-06-14 RX ADMIN — OXCARBAZEPINE 300 MG: 300 TABLET, FILM COATED ORAL at 10:55

## 2021-06-14 RX ADMIN — OLANZAPINE 10 MG: 10 TABLET, ORALLY DISINTEGRATING ORAL at 00:30

## 2021-06-14 RX ADMIN — ARIPIPRAZOLE 10 MG: 10 TABLET ORAL at 09:25

## 2021-06-14 RX ADMIN — TRAZODONE HYDROCHLORIDE 50 MG: 50 TABLET ORAL at 21:52

## 2021-06-14 RX ADMIN — OXCARBAZEPINE 300 MG: 300 TABLET, FILM COATED ORAL at 21:44

## 2021-06-14 RX ADMIN — FERROUS SULFATE TAB 325 MG (65 MG ELEMENTAL FE) 325 MG: 325 (65 FE) TAB at 17:08

## 2021-06-14 RX ADMIN — CLONIDINE HYDROCHLORIDE 0.1 MG: 0.1 TABLET ORAL at 09:25

## 2021-06-14 RX ADMIN — FERROUS SULFATE TAB 325 MG (65 MG ELEMENTAL FE) 325 MG: 325 (65 FE) TAB at 09:25

## 2021-06-14 RX ADMIN — HYDROCHLOROTHIAZIDE 12.5 MG: 25 TABLET ORAL at 09:25

## 2021-06-14 ASSESSMENT — PAIN SCALES - GENERAL: PAINLEVEL_OUTOF10: 0

## 2021-06-14 NOTE — CARE COORDINATION
[facility]  Treatment Team Note  Day 1    Review Date & Time: 0900  6/14/2021    Patient was not in treatment team      Status EXAM:   Status and Exam  Normal: No  Facial Expression: Flat  Affect: Blunt  Level of Consciousness: Alert  Mood:Normal: No  Mood: Depressed  Motor Activity:Normal: No  Motor Activity: Decreased  Interview Behavior: Cooperative  Preception: Reader to Person, Reader to Time, Reader to Place, Reader to Situation  Attention:Normal: No  Attention: Distractible  Thought Processes: Circumstantial  Thought Content:Normal: No  Thought Content: Preoccupations, Delusions  Hallucinations: Auditory (Comment)  Delusions: Yes  Delusions: Persecution  Memory:Normal: No  Memory: Poor Recent, Poor Remote  Insight and Judgment: No  Insight and Judgment: Poor Judgment, Poor Insight  Present Suicidal Ideation: No  Present Homicidal Ideation: No      Suicide Risk CSSR-S:  1) Within the past month, have you wished you were dead or wished you could go to sleep and not wake up? : No  2) Have you actually had any thoughts of killing yourself? : No  6) Have you ever done anything, started to do anything, or prepared to do anything to end your life?: No      PLAN/TREATMENT RECOMMENDATIONS UPDATE: Patient will take medication as prescribed, eat 75% of meals, attend groups, participate in milieu activities, participate in treatment team and care planning for discharge and follow up.       Esha Feliz RN

## 2021-06-14 NOTE — FLOWSHEET NOTE
Group Therapy Note    Date: 6/14/2021  Start Time: 1300  End Time:  3380  Number of Participants: 7    Type of Group: [facility]    Notes:  Pt present for [facility]. Pt participated and shared during group. Participation Level:  Active Listener and Interactive    Participation Quality: Appropriate, Attentive and Sharing      Speech:  normal      Affective Functioning: Blunted      Endings: None Reported    Modes of Intervention: Support, Socialization, Exploration and Media      Discipline Responsible: Chaplain Chanell Burdick       06/14/21 1422   Encounter Summary   Services provided to: Patient   Continue Visiting   (6/14 [facility] Service)   Complexity of Encounter Moderate   Length of Encounter 45 minutes

## 2021-06-14 NOTE — PROGRESS NOTES
Comment:  Diagnosis of Diabetes: > or = 6.5%  Increased risk of diabetes (Prediabetes): 5.7-6.4%  Glycemic Control: Nonpregnant Adults: <7.0%                    Pregnant: <6.0%        eAG 06/13/2021 108.3  mg/dL Final    TSH 06/13/2021 2.65  0.27 - 4.20 uIU/mL Final            Medications  Current Facility-Administered Medications: risperiDONE (RISPERDAL M-TABS) disintegrating tablet 2 mg, 2 mg, Oral, Nightly  OXcarbazepine (TRILEPTAL) tablet 300 mg, 300 mg, Oral, BID  hydroCHLOROthiazide (HYDRODIURIL) tablet 12.5 mg, 12.5 mg, Oral, Daily  acetaminophen (TYLENOL) tablet 650 mg, 650 mg, Oral, Q4H PRN  ibuprofen (ADVIL;MOTRIN) tablet 400 mg, 400 mg, Oral, Q6H PRN  magnesium hydroxide (MILK OF MAGNESIA) 400 MG/5ML suspension 30 mL, 30 mL, Oral, Daily PRN  aluminum & magnesium hydroxide-simethicone (MAALOX) 200-200-20 MG/5ML suspension 30 mL, 30 mL, Oral, Q6H PRN  benztropine mesylate (COGENTIN) injection 2 mg, 2 mg, Intramuscular, BID PRN  cloNIDine (CATAPRES) tablet 0.1 mg, 0.1 mg, Oral, TID  ferrous sulfate (IRON 325) tablet 325 mg, 325 mg, Oral, BID WC  pantoprazole (PROTONIX) tablet 40 mg, 40 mg, Oral, QAM AC  metoprolol tartrate (LOPRESSOR) tablet 25 mg, 25 mg, Oral, BID  traZODone (DESYREL) tablet 50 mg, 50 mg, Oral, Nightly PRN    ASSESSMENT AND PLAN    Principal Problem:    Bipolar affective disorder, manic, severe, with psychotic behavior (HCC)  Active Problems:    Alcohol abuse    Cannabis abuse    Essential hypertension  Resolved Problems:    * No resolved hospital problems. *       1. Patient s symptoms   are improving; start risperdal and trileptal and d/c abilify  2. Probable discharge is next week  3. Discharge planning is incomplete  4 Suicidal ideation is unchanged  5 I spent 35 minutes face to face and more than 50 % was spent coordinating care, exploring sx's and discussing pharmacotherapy

## 2021-06-14 NOTE — PLAN OF CARE
Problem: Depressive Behavior With or Without Suicide Precautions:  Goal: Able to verbalize and/or display a decrease in depressive symptoms  Description: Able to verbalize and/or display a decrease in depressive symptoms  6/14/2021 1237 by My Rodriguez RN  Outcome: Ongoing   Patient has been visible on the unit. She denies any thoughts of harming self or others at this time. She reports hearing voices, but at intervals. She reports poor sleep last night-states she only slept 2 hrs and reports feeling tired today. Attending groups. Able to verbalize needs. Will continue to monitor for safety.

## 2021-06-14 NOTE — BH NOTE
Purposeful Rounding     Patient Location Patient room     Patient willing to engage in conversationYes     Presentation/behavior Controlled, Cooperative and Pleasant     Affect Neutral/Euthymic(normal)     Concerns reported: none     PRN medications given:none     Environmental assessment No safety hazards noted     Fall prevention interventions in place: Yellow non-skid socks on     Daily [place] Fall Risk Score :67     Daily [place] Fall Risk Score : 0

## 2021-06-14 NOTE — BH NOTE
Purposeful Rounding     Patient Location Patient room     Patient willing to engage in conversationYes     Presentation/behavior Controlled, Cooperative and Pleasant     Affect Neutral/Euthymic(normal)     Concerns reported: none     PRN medications given:none     Environmental assessment No safety hazards noted     Fall prevention interventions in place: Yellow non-skid socks on     Daily [place] Fall Risk Score :67     Daily [place] Fall Risk Score : 0

## 2021-06-14 NOTE — FLOWSHEET NOTE
06/14/21 0228   Status and Exam   Normal No   Facial Expression Avoids Gaze;Flat   Affect Blunt   Level of Consciousness Alert   Mood:Normal No   Mood Sad;Depressed   Motor Activity:Normal No   Motor Activity Decreased   Interview Behavior Cooperative   Preception [place] to Person;[place]erve to Time;Reserve to Place   Attention:Normal No   Attention Distractible   Thought Processes Tangential   Thought Content:Normal No   Thought Content Preoccupations   Hallucinations Auditory (Comment); Visual (Comment)   Delusions No   Memory:Normal No   Memory Poor Recent; Poor Remote   Insight and Judgment No   Insight and Judgment Poor Judgment;Poor Insight   Present Suicidal Ideation No   Present Homicidal Ideation No

## 2021-06-14 NOTE — BH NOTE
Purposeful Rounding    Patient Location Day room    Patient willing to engage in conversationYes    Presentation/behavior Controlled, Cooperative and Pleasant    Affect Neutral/Euthymic(normal)    Concerns reported: none    PRN medications given:none    Environmental assessment No safety hazards noted    Fall prevention interventions in place: Yellow non-skid socks on    Daily [place] Fall Risk Score :67    Daily [place] Fall Risk Score : 0

## 2021-06-14 NOTE — GROUP NOTE
Group Therapy Note    Date: 6/14/2021    Group Start Time: 1000  Group End Time: 7989  Group Topic: Cognitive Skills    [facility]    Group Therapy Note    Attendees: 10    Patients learned about healthy vs unhealthy coping skills, were provided handout on the six types of coping skills, and explored how they could apply different coping skills to their life stressors. Patients were encouraged to create coping skills cards while the group identified healthy coping skills for each of the six types. Notes:  Pt was actively engaged in group activity and participated appropriately. Status After Intervention:  Improved    Participation Level:  Active Listener and Interactive    Participation Quality: Appropriate      Speech:  normal      Thought Process/Content: Linear      Affective Functioning: Flat and Constricted/Restricted      Mood: anxious and depressed      Level of consciousness:  Alert      Response to Learning: Progressing to goal      Endings: None Reported    Modes of Intervention: Education, Support, Socialization, Exploration, Clarifying, Problem-solving and Activity      Discipline Responsible: /Counselor      Signature:  ELIDA Wilson, MARJORIE

## 2021-06-14 NOTE — PLAN OF CARE
Problem: Depressive Behavior With or Without Suicide Precautions:  Goal: Able to verbalize and/or display a decrease in depressive symptoms  Description: Able to verbalize and/or display a decrease in depressive symptoms  6/14/2021 0243 by Ashanti Ansari RN  Outcome: Ongoing     Problem: Depressive Behavior With or Without Suicide Precautions:  Goal: Patient specific goal  Description: Patient specific goal  6/14/2021 0243 by Ashanti Ansari RN  Outcome: Ongoing     Problem: Depressive Behavior With or Without Suicide Precautions:  Goal: Participates in care planning  Description: Participates in care planning  6/14/2021 0243 by Ashanti Ansari RN  Outcome: Ongoing     Problem: Altered Mood, Manic Behavior:  Goal: Able to verbalize decrease in frequency and intensity of racing thoughts  Description: Able to verbalize decrease in frequency and intensity of racing thoughts  6/14/2021 0243 by Ashanti Ansari RN  Outcome: Ongoing   Bette Shimanuela has been mostly isolative to her room this shift. Out briefly to color or have a snack.

## 2021-06-14 NOTE — PROGRESS NOTES
Jordan Vallecillo was observed talking to herself and rocking back and forth. Patient reports she thought she was talking to her cousin. Patient has been hypertensive this shift. /114 @ 2048, 147/110 @0015. Placed call to Dr. Errol Peres to report patient status. Received new order for Zydis 10 mg PO once for hallucinations. No new order for hypertension at this time. Will monitor. Medication effective.

## 2021-06-15 PROCEDURE — 6370000000 HC RX 637 (ALT 250 FOR IP): Performed by: PHYSICIAN ASSISTANT

## 2021-06-15 PROCEDURE — 6370000000 HC RX 637 (ALT 250 FOR IP): Performed by: PSYCHIATRY & NEUROLOGY

## 2021-06-15 PROCEDURE — 1240000000 HC EMOTIONAL WELLNESS R&B

## 2021-06-15 PROCEDURE — 99233 SBSQ HOSP IP/OBS HIGH 50: CPT | Performed by: PSYCHIATRY & NEUROLOGY

## 2021-06-15 PROCEDURE — 97165 OT EVAL LOW COMPLEX 30 MIN: CPT

## 2021-06-15 PROCEDURE — 97535 SELF CARE MNGMENT TRAINING: CPT

## 2021-06-15 RX ORDER — QUETIAPINE FUMARATE 200 MG/1
200 TABLET, FILM COATED ORAL NIGHTLY
Status: DISCONTINUED | OUTPATIENT
Start: 2021-06-15 | End: 2021-06-17 | Stop reason: HOSPADM

## 2021-06-15 RX ORDER — RISPERIDONE 1 MG/1
3 TABLET, ORALLY DISINTEGRATING ORAL NIGHTLY
Status: DISCONTINUED | OUTPATIENT
Start: 2021-06-15 | End: 2021-06-17 | Stop reason: HOSPADM

## 2021-06-15 RX ORDER — TRAZODONE HYDROCHLORIDE 50 MG/1
50 TABLET ORAL ONCE
Status: COMPLETED | OUTPATIENT
Start: 2021-06-15 | End: 2021-06-15

## 2021-06-15 RX ADMIN — QUETIAPINE FUMARATE 200 MG: 200 TABLET ORAL at 20:51

## 2021-06-15 RX ADMIN — FERROUS SULFATE TAB 325 MG (65 MG ELEMENTAL FE) 325 MG: 325 (65 FE) TAB at 09:37

## 2021-06-15 RX ADMIN — TRAZODONE HYDROCHLORIDE 50 MG: 50 TABLET ORAL at 23:24

## 2021-06-15 RX ADMIN — FERROUS SULFATE TAB 325 MG (65 MG ELEMENTAL FE) 325 MG: 325 (65 FE) TAB at 17:16

## 2021-06-15 RX ADMIN — OXCARBAZEPINE 300 MG: 300 TABLET, FILM COATED ORAL at 20:51

## 2021-06-15 RX ADMIN — METOPROLOL TARTRATE 25 MG: 25 TABLET, FILM COATED ORAL at 09:37

## 2021-06-15 RX ADMIN — RISPERIDONE 3 MG: 1 TABLET, ORALLY DISINTEGRATING ORAL at 20:51

## 2021-06-15 RX ADMIN — CLONIDINE HYDROCHLORIDE 0.1 MG: 0.1 TABLET ORAL at 13:35

## 2021-06-15 RX ADMIN — HYDROCHLOROTHIAZIDE 12.5 MG: 25 TABLET ORAL at 09:38

## 2021-06-15 RX ADMIN — CLONIDINE HYDROCHLORIDE 0.1 MG: 0.1 TABLET ORAL at 09:38

## 2021-06-15 RX ADMIN — TRAZODONE HYDROCHLORIDE 50 MG: 50 TABLET ORAL at 01:15

## 2021-06-15 RX ADMIN — OXCARBAZEPINE 300 MG: 300 TABLET, FILM COATED ORAL at 09:38

## 2021-06-15 NOTE — PROGRESS NOTES
...Purposeful Rounding    Patient Location Day room    Patient willing to engage in conversationYes    Presentation/behavior Controlled, Cooperative and Pleasant    Affect Neutral/Euthymic(normal)    Concerns reported: none    PRN medications given:none    Environmental assessment No safety hazards noted    Fall prevention interventions in place: Yellow non-skid socks on    Daily [place] Fall Risk Score :67    Daily [place] Fall Risk Score : 0

## 2021-06-15 NOTE — PROGRESS NOTES
Inpatient Occupational Therapy  Evaluation and Treatment    Unit:  [facility]  Date:  6/15/2021  Patient Name:    Matty Thomas  Admitting diagnosis:  Bipolar disorder [facility]) [F31.9]  Admit Date:  6/12/2021  Precautions/Restrictions/WB Status/ Lines/ Wounds/ Oxygen:  Up as tolerated  Treatment Time:  9:40-10:03  Treatment Number:  1    Patient Goals for Therapy: \"Heal my emotions. \"      Discharge Recommendations:   []Home Independently  [x] Home with assist [] Home OT  []SNF  []ARU    DME needs for discharge:   NA     AM-PAC Score:    24     Home Health S4 Level: [x] NA   [] Level 1- Standard  []  Level 2- Social  [] Level 3- Safety  []  Level 4- Sick    ACLS:  Deferred secondary to decreased arousal.      HISTORY OF PRESENT ILLNESS:  per chart note by Darrick Colorado MD; Date of Service:  6/13/2021  A 71-year-old female with alcohol  intoxication and manic symptoms, presented to [facility] on  06/10/2021 for treatment. She was admitted. Again, was seen by Dr. Catalina Schilling MD, Psychiatry, 06/11/2021. At that time, he diagnosed  her with bipolar 1, janny with psychotic features, after she cleared  from her alcohol intoxication. Apparently, family called police with  concern and she arrived in a psychiatric hold. She appeared to be manic  in the ED, according to his report. Her thought processes reportedly  were disjointed and tangential.   DIAGNOSES:  AXIS I:  1. Bipolar affective disorder, janny, severe with psychotic features. 2.  Alcohol abuse. 3.  Cannabis abuse. AXIS II:  Deferred. AXIS III:  Unremarkable. AXIS IV:  Severe. AXIS V:  40.      Preadmission Environment:    Pt. Lives   [] alone  [x]with daughters, son  Home environment:   []Apartment   []one story  [x]two story home.   Steps to enter first floor:    [] No steps     [x]  1 Steps to enter   [] Railings    Steps to second floor  [x] Full Flight of 13  Bathroom: [x] Bath Tub Shower  [x]Walk in [place]  [] [employer] owned: [] RW [] SW  []Rollator []W/C  []Shower Chair []BSC []Reacher  []Cane [] Other     Preadmission Status / PLOF:  History of falls   [x]Yes  []No   Fell down stairs. Pt. Reports that she missed a steps. Pt. Able to drive   [x]Yes  []No   Pt Fully independent for ADLs/IADLs. [x]Yes  []No    Pt. Required assistance from family for:  []Bathing []Dressing []Cooking []Cleaning  []Laundry  []Other :   Pt. Fully independent for transfers and gait and walked with: [x]No Device  []Walker   []Cane  Sleep Hygiene:   1-2 hours sleep avg; fragmented sleep; difficulty with sleep onset. Income:  Self-employed; \"sell things\"; SSDI  Financial Management:   Self; pt reports no difficulty managing finances. Leisure Interests:  Go out and drink with friends and family. Music, dancing, movies, TV  Medication Management:   Self; pt reports running out of medications. Pt. Unable to stated memory strategies to take meds timely/consistently. Health Management:  Pt. Reports that she can't recall any names of health care providers. Social Network: Mother, brother  Stressors:  Sometimes I just feel lonely. Pt. Reports difficulty with relationships. Coping Skills:  Stay busy, go to bars/lounges, museums    Pain  [x]Yes  []No  Ratin:10  Location:  back  Pain Medicine Status: [] Denies need  [] Pain med requested  [x] RN notified. Cognition    A&Ox person, date, place. Pt. Stated she is here because she got drunk and was talking about suicide. Follows []1 step and []2 step commands. Pt. Presents with difficulty remaining awake during session. Pt. Presents with slurred speech and mumbling. Decreased insight into limitations. Decreased level of alertness. Decreased safety. Upper Extremity ROM:    WFL, pt able to perform all bed mobility, transfers, and gait without ROM limitation. Upper Extremity Strength:    WFL, pt able to perform all bed mobility, transfers, and gait without strength limitation.     Upper Extremity Sensation:    No apparent deficits. Upper Extremity Proprioception:    No apparent deficits. Skin Integrity:  WFL     Coordination and Tone:  WFL    Balance  Static Sitting:  [x] Good [] Fair [] Poor  Dynamic Sitting:  [x] Good [] Fair [] Poor  Static Standing: [x] Good [] Fair [] Poor  Dynamic Standing: [x] Good [] Fair [] Poor    Bed mobility:  Independent  Supine to sit:  Sit to supine:  Scooting to head of bed:  Scooting in sitting:  Rolling:  Bridging:    Transfers:   Independent  Sit to stand:  Stand to sit:  Bed/Chair to/from toilet:    Self Care: Independent  Toileting:  Grooming:  Dressing:    Exercise / Activities Initiated:   Pt. Educated on role of OT. Pt. Participated in:  Eval, ADL Retraining, medication management. Assessment of Deficits:   Pt demonstrated decreased activity tolerance, decreased safety awareness, decreased cognition, and decreased ADL/IADL status. Pt. Limited during evaluation by decreased cognition. At end of evaluation, pt. In gathering room. Goal(s) : To be met in 3 Visits:  1). Pt. To complete ACLS. 2). Pt. To verbalize understanding of sleep hygiene education. To be met in 5 Visits:  1). Pt. To complete 1 SMART long term goal and 2 SMART short term goals with mod assist.  2). Pt. To verbalize 3 new coping skills. 3). Pt. To complete interest check list.    4). Pt. To verbalize understanding of 3 communication styles. 5). Pt. To complete wellness plan. 6). Pt. To complete a daily schedule of healthy activities/routines with mod assist.   7). Pt. To identify 2 memory strategies to take medications as prescribed. Rehabilitation Potential:  Good for goals listed above. Strengths for achieving goals include:  PLOF  Barriers to achieving goals include:  Decreased Cognition     Plan: To be seen 2-5x/week while in acute care setting for therapeutic exercises/act, ADL retraining, NMR and patient/caregiver ed/instruction.      Timed Code Treatment Minutes:   13  minutes    Total Treatment Time:    23  minutes    Signature and License Number    Brian Hung OTR/L  #[id number]        If patient discharges from this facility prior to next visit, this note will serve as the Discharge Summary

## 2021-06-15 NOTE — PROGRESS NOTES
Patent out to the team station and requested something to help her sleep tonight since first dose of trazodone wasn't effective. Dr. Ailyn Pfeiffer called and new order for one time dose of trazodone 50 mg po ordered.

## 2021-06-15 NOTE — PROGRESS NOTES
...Purposeful Rounding    Patient Location Patient room    Patient willing to engage in conversationNo    Presentation/behavior Other resting*    Affect resting    Concerns reported: no    PRN medications given:no    Environmental assessment No safety hazards noted    Fall prevention interventions in place: Yellow non-skid socks on    Daily [place] Fall Risk Score :67    Daily [place] Fall Risk Score : 0

## 2021-06-15 NOTE — PROGRESS NOTES
...Purposeful Rounding    Patient Location Day room    Patient willing to engage in conversationYes    Presentation/behavior Anxious, Withdrawn, Controlled and Cooperative    Affect Flat/blunted    Concerns reported: no    PRN medications given:no    Environmental assessment No safety hazards noted    Fall prevention interventions in place: Yellow non-skid socks on    Daily [place] Fall Risk Score :67    Daily [place] Fall Risk Score : 0

## 2021-06-15 NOTE — GROUP NOTE
Group Therapy Note    Date: 6/15/2021    Group Start Time: 1120  Group End Time: 1200  Group Topic: Psychotherapy    [facility]    Ludivina Barrett, [facility]        Group Therapy Note    Attendees: 5         Patient's Goal:  Pt's goal was to open up to others. Notes:  Pt was engaged in group. Pt share, gave support and feedback. Pt met her goal.      Status After Intervention:  Improved    Participation Level:  Active Listener and Interactive    Participation Quality: Appropriate, Attentive, Sharing and Supportive      Speech:  normal      Thought Process/Content: Logical  Linear      Affective Functioning: Congruent      Mood: anxious and depressed      Level of consciousness:  Alert      Response to Learning: Able to verbalize current knowledge/experience and Progressing to goal      Endings: None Reported    Modes of Intervention: Education, Support, Socialization, Exploration, Clarifying, Problem-solving, Activity, Movement, Confrontation, Limit-setting and Reality-testing      Discipline Responsible: /Counselor      Signature:  Ludivina Barrett, [facility]

## 2021-06-15 NOTE — PLAN OF CARE
Problem: Depressive Behavior With or Without Suicide Precautions:  Goal: Able to verbalize acceptance of life and situations over which he or she has no control  Description: Able to verbalize acceptance of life and situations over which he or she has no control  Outcome: Ongoing  Goal: Able to verbalize and/or display a decrease in depressive symptoms  Description: Able to verbalize and/or display a decrease in depressive symptoms  Outcome: Ongoing  Goal: Ability to disclose and discuss suicidal ideas will improve  Description: Ability to disclose and discuss suicidal ideas will improve  Outcome: Ongoing  Goal: Able to verbalize support systems  Description: Able to verbalize support systems  Outcome: Ongoing  Goal: Absence of self-harm  Description: Absence of self-harm  Outcome: Ongoing  Goal: Patient specific goal  Description: Patient specific goal  Outcome: Ongoing  Goal: Participates in care planning  Description: Participates in care planning  Outcome: Ongoing     Problem: Altered Mood, Manic Behavior:  Goal: Ability to disclose and discuss suicidal ideas will improve  Description: Ability to disclose and discuss suicidal ideas will improve  Outcome: Ongoing  Goal: Absence of self-harm  Description: Absence of self-harm  Outcome: Ongoing  Goal: Able to sleep  Description: Able to sleep  Outcome: Ongoing  Goal: Able to verbalize decrease in frequency and intensity of racing thoughts  Description: Able to verbalize decrease in frequency and intensity of racing thoughts  Outcome: Ongoing  Goal: Ability to interact with others will improve  Description: Ability to interact with others will improve  Outcome: Ongoing  Goal: Mood stable  Description: Mood stable  Outcome: Ongoing   Patient denied SI/HI,A/V hallucinations this evening. She is medication compliant. She is attending groups and noted to be eating at snack time. She denied any complaint of pain, she is asked to come to the staff if pain issues occur. Patient agrees to do this. Safety checks continue Q 15 minutes through out the shift.

## 2021-06-15 NOTE — BH NOTE
Group Therapy Note    Date: 6/14/2021  Start Time: 20:00  End Time:  21:00  Number of Participants: 8    Type of Group: Recreational  Wrap up    Marc Kelley Information  Module Name:  /  Session Number:  /    Patient's Goal:  Better coping skills    Notes:  Continuing to work on goal    Status After Intervention:  Unchanged    Participation Level:  Active Listener and Interactive    Participation Quality: Appropriate and Sharing      Speech:  pressured      Thought Process/Content: Flight of ideas      Affective Functioning: Blunted      Mood: anxious      Level of consciousness:  Alert and Attentive      Response to Learning: Able to change behavior      Endings: None Reported    Modes of Intervention: Socialization and Problem-solving      Discipline Responsible: [address], [employer]      Signature:  Enedelia Coon

## 2021-06-15 NOTE — GROUP NOTE
Group Therapy Note    Date: 6/15/2021    Group Start Time: 1000  Group End Time: 2288  Group Topic: Art Therapy     [address]        Group Therapy Note    Attendees: 10    Group members engaged in an art therapy directive \"Create a Safe Space\". Group members were encouraged to focus on sensory related experiences associated with their safe space. Each person was given an opportunity to share their drawing and receive support from the group members. Focus was placed on healthy coping and decreasing anxiety in stressful situations. Notes:  Joe Steen displayed positive ability to engage in the art therapy directive and group discussion. She was able to express herself creatively and was able to share her drawing and the description with the group. She was able to describe sensory experiences related to her relaxing place and her affect appeared to brighten when describing \"falling rain and greenery\". Her thought processes seemed more clear, although her drawing appeared disjointed and abstract. Status After Intervention:  Improved    Participation Level:  Active Listener and Interactive    Participation Quality: Appropriate, Attentive and Sharing      Speech:  normal      Thought Process/Content: Logical  Linear      Affective Functioning: Congruent and Constricted/Restricted      Mood: anxious and depressed (decreased)      Level of consciousness:  Alert, Oriented x4 and Attentive      Response to Learning: Able to verbalize current knowledge/experience, Able to verbalize/acknowledge new learning, Able to retain information and Progressing to goal      Endings: None Reported    Modes of Intervention: Education, Support, Exploration, Clarifying, Problem-solving and Activity      Discipline Responsible: Psychoeducational Specialist      Signature:  Anali Retana MS, ATR-BC

## 2021-06-16 PROCEDURE — 6370000000 HC RX 637 (ALT 250 FOR IP): Performed by: PSYCHIATRY & NEUROLOGY

## 2021-06-16 PROCEDURE — 99233 SBSQ HOSP IP/OBS HIGH 50: CPT | Performed by: PSYCHIATRY & NEUROLOGY

## 2021-06-16 PROCEDURE — 1240000000 HC EMOTIONAL WELLNESS R&B

## 2021-06-16 PROCEDURE — 6370000000 HC RX 637 (ALT 250 FOR IP): Performed by: PHYSICIAN ASSISTANT

## 2021-06-16 RX ADMIN — FERROUS SULFATE TAB 325 MG (65 MG ELEMENTAL FE) 325 MG: 325 (65 FE) TAB at 17:40

## 2021-06-16 RX ADMIN — CLONIDINE HYDROCHLORIDE 0.1 MG: 0.1 TABLET ORAL at 21:54

## 2021-06-16 RX ADMIN — METOPROLOL TARTRATE 25 MG: 25 TABLET, FILM COATED ORAL at 21:54

## 2021-06-16 RX ADMIN — CLONIDINE HYDROCHLORIDE 0.1 MG: 0.1 TABLET ORAL at 14:34

## 2021-06-16 RX ADMIN — PANTOPRAZOLE SODIUM 40 MG: 40 TABLET, DELAYED RELEASE ORAL at 09:14

## 2021-06-16 RX ADMIN — OXCARBAZEPINE 300 MG: 300 TABLET, FILM COATED ORAL at 09:14

## 2021-06-16 RX ADMIN — OXCARBAZEPINE 300 MG: 300 TABLET, FILM COATED ORAL at 21:54

## 2021-06-16 RX ADMIN — FERROUS SULFATE TAB 325 MG (65 MG ELEMENTAL FE) 325 MG: 325 (65 FE) TAB at 09:14

## 2021-06-16 RX ADMIN — QUETIAPINE FUMARATE 200 MG: 200 TABLET ORAL at 21:54

## 2021-06-16 RX ADMIN — METOPROLOL TARTRATE 25 MG: 25 TABLET, FILM COATED ORAL at 09:14

## 2021-06-16 RX ADMIN — HYDROCHLOROTHIAZIDE 12.5 MG: 25 TABLET ORAL at 09:14

## 2021-06-16 RX ADMIN — RISPERIDONE 3 MG: 1 TABLET, ORALLY DISINTEGRATING ORAL at 21:54

## 2021-06-16 RX ADMIN — CLONIDINE HYDROCHLORIDE 0.1 MG: 0.1 TABLET ORAL at 09:14

## 2021-06-16 RX ADMIN — TRAZODONE HYDROCHLORIDE 50 MG: 50 TABLET ORAL at 23:21

## 2021-06-16 ASSESSMENT — PAIN SCALES - GENERAL: PAINLEVEL_OUTOF10: 0

## 2021-06-16 NOTE — FLOWSHEET NOTE
Purposeful Rounding    Patient Location Day room    Patient willing to engage in conversationYes    Presentation/behavior Cooperative and Pleasant    Affect Neutral/Euthymic(normal)    Concerns reported: none    PRN medications given:no    Environmental assessment Room free from clutter, Clear path to bathroom  and Adequate lighting    Fall prevention interventions in place: Yellow non-skid socks on    Daily [place] Fall Risk Score :42    Daily [place] Fall Risk Score : low

## 2021-06-16 NOTE — GROUP NOTE
Group Therapy Note    Date: 6/16/2021    Group Start Time: 5560  Group End Time: 1430  [facility]        Group Therapy Note    Attendees: 5         Patient's Goal:  Patient will complete worksheet on Living with Fear. Will explore ways that fear effects mood, mental health, and ability to set boundaries with others. Will explore how fear effects various areas of life. Notes: Patient attended group. Completed the worksheet and discussed. Patient gave examples of how fear affects her relationships. Talked about how fear effects her ability to set boundaries. Received support and feedback from peers. Status After Intervention:  Improved    Participation Level:  Active Listener and Interactive    Participation Quality: Appropriate, Attentive, Sharing and Supportive    Speech:  normal    Thought Process/Content: Logical Linear    Affective Functioning: Congruent    Mood: anxious and depressed    Level of consciousness:  Oriented x4    Response to Learning: Able to verbalize current knowledge/experience    Endings: None Reported    Modes of Intervention: Education, Support, Socialization and Exploration    Discipline Responsible: /Counselor    Signature:  Dillard Councilman, [facility]

## 2021-06-16 NOTE — PROGRESS NOTES
Hemoglobin A1C 06/13/2021 5.4  See comment % Final    Comment: Comment:  Diagnosis of Diabetes: > or = 6.5%  Increased risk of diabetes (Prediabetes): 5.7-6.4%  Glycemic Control: Nonpregnant Adults: <7.0%                    Pregnant: <6.0%        eAG 06/13/2021 108.3  mg/dL Final    TSH 06/13/2021 2.65  0.27 - 4.20 uIU/mL Final            Medications  Current Facility-Administered Medications: QUEtiapine (SEROQUEL) tablet 200 mg, 200 mg, Oral, Nightly  risperiDONE (RISPERDAL M-TABS) disintegrating tablet 3 mg, 3 mg, Oral, Nightly  OXcarbazepine (TRILEPTAL) tablet 300 mg, 300 mg, Oral, BID  hydroCHLOROthiazide (HYDRODIURIL) tablet 12.5 mg, 12.5 mg, Oral, Daily  acetaminophen (TYLENOL) tablet 650 mg, 650 mg, Oral, Q4H PRN  ibuprofen (ADVIL;MOTRIN) tablet 400 mg, 400 mg, Oral, Q6H PRN  magnesium hydroxide (MILK OF MAGNESIA) 400 MG/5ML suspension 30 mL, 30 mL, Oral, Daily PRN  aluminum & magnesium hydroxide-simethicone (MAALOX) 200-200-20 MG/5ML suspension 30 mL, 30 mL, Oral, Q6H PRN  benztropine mesylate (COGENTIN) injection 2 mg, 2 mg, Intramuscular, BID PRN  cloNIDine (CATAPRES) tablet 0.1 mg, 0.1 mg, Oral, TID  ferrous sulfate (IRON 325) tablet 325 mg, 325 mg, Oral, BID WC  pantoprazole (PROTONIX) tablet 40 mg, 40 mg, Oral, QAM AC  metoprolol tartrate (LOPRESSOR) tablet 25 mg, 25 mg, Oral, BID  traZODone (DESYREL) tablet 50 mg, 50 mg, Oral, Nightly PRN    ASSESSMENT AND PLAN    Principal Problem:    Bipolar affective disorder, manic, severe, with psychotic behavior (HCC)  Active Problems:    Alcohol abuse    Cannabis abuse    Essential hypertension  Resolved Problems:    * No resolved hospital problems. *       1. Patient s symptoms   are improving; Inc risperdal and cont trileptal and start seroquel 200 mg hs  2. Probable discharge is 2-3 days  3. Discharge planning is incomplete  4 Suicidal ideation is unchanged  5 I spent 35 minutes face to face and more than 50 % was spent coordinating care, exploring sx's and discussing pharmacotherapy

## 2021-06-16 NOTE — FLOWSHEET NOTE
Purposeful Rounding    Patient Location Day room    Patient willing to engage in conversationYes    Presentation/behavior Cooperative and Pleasant    Affect Neutral/Euthymic(normal)    Concerns reported: none    PRN medications given:no    Environmental assessment Room free from clutter, Clear path to bathroom  and Adequate lighting    Fall prevention interventions in place: Yellow non-skid socks on    Daily [place] Fall Risk Score :42    Daily [place] Fall Risk Score : low

## 2021-06-16 NOTE — PLAN OF CARE
Problem: Depressive Behavior With or Without Suicide Precautions:  Goal: Able to verbalize and/or display a decrease in depressive symptoms  Description: Able to verbalize and/or display a decrease in depressive symptoms  6/16/2021 1437 by Alexandria Scott RN  Outcome: Ongoing     Problem: Depressive Behavior With or Without Suicide Precautions:  Goal: Absence of self-harm  Description: Absence of self-harm  Outcome: Ongoing   Patient has been visible out on the unit since the start of shift. She is A&Ox4 and denies that she is having thoughts to harm self or others. She denies that she is experiencing hallucinations and did not make any delusional statements while interacting with staff. She is focused and happy that she had a good 5 hours of sleep last night and that was a big reason why she came into the hospital that she was not sleeping. She is hoping for discharge today to return home to children, but when she met with the provider the plan will be to stay one more night with discharge tomorrow. She was cooperative with medication and going to groups. She has been no issue on the unit.

## 2021-06-16 NOTE — GROUP NOTE
Group Therapy Note    Date: 6/15/2021    Group Start Time: 2020  Group End Time: 2045  Group Topic: Wrap-Up    [facility]        Group Therapy Note    Attendees: Goals and importance of goal setting discussed. Night time milieu activities discussed.            Patient's Goal:  To keep my head high and keep overlooking obstacles    Notes:  Successful     Status After Intervention:  Improved    Participation Level: Interactive    Participation Quality: Lethargic      Speech:  slurred      Thought Process/Content: Logical  Linear      Affective Functioning: Flat      Mood: tired, pt falling asleep and snoring during group      Level of consciousness:  Oriented x4 and Drowsy      Response to Learning: Progressing to goal      Endings: None Reported    Modes of Intervention: Support      Discipline Responsible: [address], [employer]      Signature:  Aye Norris

## 2021-06-16 NOTE — GROUP NOTE
Group Therapy Note    Date: 6/16/2021    Group Start Time: 1000  Group End Time: 1100  Group Topic: Cognitive Skills    [address] [facility]        Group Therapy Note    Attendees: 4         Patient's Goal: Patient was invited to participate in a Cognitive Skills group on sleep hygiene. Patient was provided with and encouraged to discuss a handout on how to practice good sleep hygiene and were asked to explore patient's own sleep practices. Patient was asked to create a daily routine and schedule and to include barriers to sleep hygiene and potential ways to overcome identified barriers. Notes:  Bette Colon engaged actively in group discussion on sleep hygiene and shared she has a \"flipped sleep routine. I sleep in the day and stay awake at night. \"    Status After Intervention:  Unchanged    Participation Level:  Active Listener and Interactive    Participation Quality: Appropriate, Attentive and Sharing      Speech:  pressured      Thought Process/Content: Flight of ideas      Affective Functioning: Congruent      Mood: anxious      Level of consciousness:  Alert and Attentive      Response to Learning: Able to verbalize current knowledge/experience, Able to verbalize/acknowledge new learning, Able to retain information and Capable of insight      Endings: None Reported    Modes of Intervention: Education, Support, Socialization, Exploration, Clarifying, Problem-solving and Activity      Discipline Responsible: Psychoeducational Specialist      Signature:  Delon Bautista

## 2021-06-16 NOTE — FLOWSHEET NOTE
Purposeful Rounding    Patient Location Day room    Patient willing to engage in conversationYes    Presentation/behavior Cooperative    Affect Neutral/Euthymic(normal) and Brightens with interaction    Concerns reported: none    PRN medications given:no    Environmental assessment Room free from clutter, Clear path to bathroom  and Adequate lighting    Fall prevention interventions in place: Yellow non-skid socks on    Daily [place] Fall Risk Score :63    Daily [place] Fall Risk Score : low

## 2021-06-16 NOTE — PROGRESS NOTES
Department of Psychiatry  Nurse Practitioner Student Progress Note  Chief Complaint: follow-up janny and psychosis with   Patient's chart was reviewed and collaborated with  about the treatment plan. SUBJECTIVE:    Patient is feeling better. Suicidal ideation:  denies suicidal ideation. Patient does not have medication side effects. Pt states her mind is starting to slow down and she is becoming more clear with her thoughts. Pt reports the auditory hallucinations are still there but they are \"becoming in the background more\". Reports the Seroquel helped her sleep and the Risperdal and trileptal working good with the Seroquel. Pt states the trazodone was not helpful for her sleep. D/c trazodone. ROS: Patient has new complaints: no  Sleeping adequately:  Yes   Appetite adequate: Yes  Attending groups: Yes  Visitors:No    OBJECTIVE    Physical  VITALS:  BP (!) 140/94   Pulse 70   Temp 98.4 °F (36.9 °C) (Temporal)   Resp 18   Ht 5' 7\" (1.702 m)   Wt 188 lb 8 oz (85.5 kg)   SpO2 95%   BMI 29.52 kg/m²     Mental Status Examination:  Patients appearance was well-appearing, street clothes, good grooming and good hygiene. Thoughts are [place]. Homicidal ideations none. No abnormal movements, tics or mannerisms. Memory intact [place] 0. Concentration Fair. Alert and oriented X 4. Insight and Judgement impaired. Patient was cooperative. Patient gait normal. Mood decreased range, affect- mood congruent. Hallucinations Present- auditory, internal stimuli AEB looking around room,  suicidal ideations no specific plan to harm self Speech normal volume, soft pitch.   Data  Labs:   Admission on 06/12/2021   Component Date Value Ref Range Status    Cholesterol, Total 06/13/2021 198  0 - 199 mg/dL Final    Triglycerides 06/13/2021 283* 0 - 150 mg/dL Final    HDL 06/13/2021 59  40 - 60 mg/dL Final    LDL Calculated 06/13/2021 82  <100 mg/dL Final    VLDL Cholesterol Calculated 06/13/2021 57  Not pharmacotherapy. \"I was present with the resident during the visit. I discussed the case with the resident and agree with the note as documented by the resident. \"

## 2021-06-17 VITALS
SYSTOLIC BLOOD PRESSURE: 132 MMHG | BODY MASS INDEX: 29.58 KG/M2 | TEMPERATURE: 97.3 F | WEIGHT: 188.5 LBS | HEIGHT: 67 IN | OXYGEN SATURATION: 98 % | DIASTOLIC BLOOD PRESSURE: 80 MMHG | RESPIRATION RATE: 16 BRPM | HEART RATE: 73 BPM

## 2021-06-17 PROCEDURE — 99239 HOSP IP/OBS DSCHRG MGMT >30: CPT | Performed by: PSYCHIATRY & NEUROLOGY

## 2021-06-17 PROCEDURE — 5130000000 HC BRIDGE APPOINTMENT

## 2021-06-17 PROCEDURE — 6370000000 HC RX 637 (ALT 250 FOR IP): Performed by: PSYCHIATRY & NEUROLOGY

## 2021-06-17 PROCEDURE — 6370000000 HC RX 637 (ALT 250 FOR IP): Performed by: PHYSICIAN ASSISTANT

## 2021-06-17 RX ORDER — PANTOPRAZOLE SODIUM 40 MG/1
40 TABLET, DELAYED RELEASE ORAL
Qty: 30 TABLET | Refills: 0 | Status: SHIPPED | OUTPATIENT
Start: 2021-06-18

## 2021-06-17 RX ORDER — OXCARBAZEPINE 300 MG/1
300 TABLET, FILM COATED ORAL 2 TIMES DAILY
Qty: 60 TABLET | Refills: 0 | Status: SHIPPED | OUTPATIENT
Start: 2021-06-17

## 2021-06-17 RX ORDER — CLONIDINE HYDROCHLORIDE 0.1 MG/1
0.1 TABLET ORAL 3 TIMES DAILY
Qty: 90 TABLET | Refills: 0 | Status: SHIPPED | OUTPATIENT
Start: 2021-06-17

## 2021-06-17 RX ORDER — HYDROCHLOROTHIAZIDE 12.5 MG/1
12.5 TABLET ORAL DAILY
Qty: 30 TABLET | Refills: 0 | Status: SHIPPED | OUTPATIENT
Start: 2021-06-18

## 2021-06-17 RX ORDER — QUETIAPINE FUMARATE 200 MG/1
200 TABLET, FILM COATED ORAL NIGHTLY
Qty: 30 TABLET | Refills: 0 | Status: SHIPPED | OUTPATIENT
Start: 2021-06-17

## 2021-06-17 RX ORDER — RISPERIDONE 3 MG/1
3 TABLET, ORALLY DISINTEGRATING ORAL NIGHTLY
Qty: 30 TABLET | Refills: 0 | Status: SHIPPED | OUTPATIENT
Start: 2021-06-17

## 2021-06-17 RX ADMIN — FERROUS SULFATE TAB 325 MG (65 MG ELEMENTAL FE) 325 MG: 325 (65 FE) TAB at 08:52

## 2021-06-17 RX ADMIN — OXCARBAZEPINE 300 MG: 300 TABLET, FILM COATED ORAL at 08:52

## 2021-06-17 RX ADMIN — CLONIDINE HYDROCHLORIDE 0.1 MG: 0.1 TABLET ORAL at 08:52

## 2021-06-17 RX ADMIN — PANTOPRAZOLE SODIUM 40 MG: 40 TABLET, DELAYED RELEASE ORAL at 08:52

## 2021-06-17 RX ADMIN — IBUPROFEN 400 MG: 400 TABLET, FILM COATED ORAL at 08:54

## 2021-06-17 RX ADMIN — METOPROLOL TARTRATE 25 MG: 25 TABLET, FILM COATED ORAL at 08:52

## 2021-06-17 RX ADMIN — HYDROCHLOROTHIAZIDE 12.5 MG: 25 TABLET ORAL at 08:52

## 2021-06-17 ASSESSMENT — PAIN SCALES - GENERAL: PAINLEVEL_OUTOF10: 8

## 2021-06-17 NOTE — PROGRESS NOTES
Purposeful Rounding    Patient Location Patient room    Patient willing to engage in conversationYes    Presentation/behavior Cooperative    Affect Neutral/Euthymic(normal)    Concerns reported: No    PRN medications given:No    Environmental assessment Room free from clutter and Clear path to bathroom     Fall prevention interventions in place: N/A    Daily Day Fall Risk Score :59    Daily Soto Fall Risk Score : 0

## 2021-06-17 NOTE — BH NOTE
FOCUS: Pain Control. Patient stated upon assessment that she was having 8/10 back pain, and requested some Motrin. At 0854, administered Motrin 400 mg po per PRN order. Upon reassessment at 0593, patient stated the medication was effective.

## 2021-06-17 NOTE — PROGRESS NOTES
Purposeful Rounding    Patient Location Patient room    Patient willing to engage in conversationNo    Presentation/behavior Other Asleep*    Affect Neutral/Euthymic(normal)    Concerns reported: NO    PRN medications given:NO    Environmental assessment Room free from clutter and Clear path to bathroom     Fall prevention interventions in place: N/A    Daily Cristobal Fall Risk Score :59    Daily Soto Fall Risk Score : 0

## 2021-06-17 NOTE — SUICIDE SAFETY PLAN
SAFETY PLAN    A suicide Safety Plan is a document that supports someone when they are having thoughts of suicide. Warning Signs that indicate a suicidal crisis may be developing: What (situations, thoughts, feelings, body sensations, behaviors, etc.) do you experience that lets you know you are beginning to think about suicide? 1. Loud noise. 2. Yelling. 3. Argument in the midst.    Internal Coping Strategies:  What things can I do (relaxation techniques, hobbies, physical activities, etc.) to take my mind off my problems without contacting another person? 1. Read  2. Puzzle Games  3. Watch Tv shows, sports and games. People and social settings that provide distraction: Who can I call or where can I go to distract me? 1. Name: Outside  Phone: None listed. 2. Name: Restaurant Appearance Phone: None listed. 3. Place: Public Bathroom   Phone: None listed. People whom I can ask for help: Who can I call when I need help - for example, friends, family, clergy, someone else? 1. Name: Parents        Phone: [phone]  2. Name: Siblings Phone: [phone]  3. Name: Children Phone: None listed. Professionals or [address] [place], [place] [address]vd I can contact during a crisis: Who can I call for help - for example, my doctor, my psychiatrist, my psychologist, a mental health provider, a suicide hotline? 1. Clinician Name:   Urgent Care ([address]):  Phone: None listed. [facility]: Phone: [phone].       2. Suicide Prevention Lifeline: 6-726-401-TALK (5716)    3. [address] [place] [address] Emergency Services -  for example, [facility] suicide hotline, [facility] Hotline: Kimberly Torres   Phone: [phone]    Making the environment safe: How can I make my environment (house/apartment/living space) safer? For example, can I remove guns, medications, and other items?   1. Stay out of people's way and boundaries that are full of

## 2021-06-17 NOTE — BH NOTE
Purposeful Rounding    Patient Location Day room    Patient willing to engage in conversationYes    Presentation/behavior Cooperative and Pleasant    Affect Neutral/Euthymic(normal)    Concerns reported: Discussed tentative discharge. PRN medications given: None.     Environmental assessment Room free from clutter, Clear path to bathroom  and No safety hazards noted    Fall prevention interventions in place: Yellow non-skid socks on

## 2021-06-17 NOTE — BH NOTE
Purposeful Rounding    Patient Location Day room    Patient willing to engage in conversationYes    Presentation/behavior Cooperative and Pleasant    Affect Neutral/Euthymic(normal)    Concerns reported: Pain Control. PRN medications given: PRN Motrin 400 mg @ 3160, effective.     Environmental assessment Room free from clutter, Clear path to bathroom  and No safety hazards noted    Fall prevention interventions in place: Yellow non-skid socks on

## 2021-06-17 NOTE — CARE COORDINATION
[facility]  Treatment Team Note  Day 3    Review Date & Time: 0900  6/16/2021    Patient was not in treatment team      Status EXAM:   Status and Exam  Normal: No  Facial Expression: Flat, Brightened  Affect: Blunt  Level of Consciousness: Alert  Mood:Normal: No  Mood: Anxious  Motor Activity:Normal: Yes  Motor Activity: Decreased  Interview Behavior: Cooperative  Preception: [place] to Person, [place] to Time, [place] to Place  Attention:Normal: No  Attention: Distractible  Thought Processes: Blocking  Thought Content:Normal: No  Thought Content: Compulsions  Hallucinations: None  Delusions: No  Delusions: Persecution  Memory:Normal: No  Memory: Poor Recent  Insight and Judgment: No  Insight and Judgment: Poor Judgment, Poor Insight  Present Suicidal Ideation: No  Present Homicidal Ideation: No      Suicide Risk CSSR-S:  1) Within the past month, have you wished you were dead or wished you could go to sleep and not wake up? : No  2) Have you actually had any thoughts of killing yourself? : No  6) Have you ever done anything, started to do anything, or prepared to do anything to end your life?: No      PLAN/TREATMENT RECOMMENDATIONS UPDATE: Patient will take medication as prescribed, eat 75% of meals, attend groups, participate in milieu activities, participate in treatment team and care planning for discharge and follow up. Probable discharge tomorrow.      Abner Rodriguez RN

## 2021-06-17 NOTE — GROUP NOTE
Group Therapy Note    Date: 6/16/2021    Group Start Time: 2030  Group End Time: 2115  Group Topic: Wrap-Up    [address] [place] [address]        Group Therapy Note    Attendees: goals and importance of goal setting discussed. Night time milieu activities discussed         Patient's Goal: to be straight forward, objective,happy and not let anyone hurt her feelings or steel her michelet    Notes:  Successful    Status After Intervention:  Unchanged    Participation Level:  Active Listener    Participation Quality: Appropriate      Speech:  normal      Thought Process/Content: Logical      Affective Functioning: Congruent      Mood: Happy      Level of consciousness:  Alert      Response to Learning: Able to retain information      Endings: None Reported    Modes of Intervention: Support      Discipline Responsible: [address] [employer]      Signature:  Cris Decker

## 2021-06-17 NOTE — PLAN OF CARE
Patient was out with patient group, early in the shift & was social. Patient was verbal in 1:1 & reported eating better since admission. \"I'm getting more like myself. \" Patient denied having hallucinations, will no delusions or paranoia noted. Samira García R.N.

## 2021-06-17 NOTE — CARE COORDINATION
[facility]  Discharge Note    Pt discharged with followings belongings:   Dentures: None  Vision - Corrective Lenses: None  Hearing Aid: None  Jewelry: Necklace  Clothing: Pants, Shirt, Undergarments (Comment), Footwear  Were All Patient Medications Collected?: Not Applicable  Other Valuables: Purse, Money (Comment), Other (Comment) (change no wallet, pistol BB gun)   Valuables sent home with patient. Valuables retrieved from safe and returned to patient. Patient education on aftercare instructions: yes. Information faxed to [facility] by this writer. Patient verbalize understanding of AVS:  yes. Status EXAM upon discharge:  Status and Exam  Normal: No  Facial Expression: Flat, Brightened  Affect: Blunt  Level of Consciousness: Alert  Mood:Normal: No  Mood: Anxious  Motor Activity:Normal: Yes  Motor Activity: Decreased  Interview Behavior: Cooperative  Preception: [place] to Person, Daiana Shackleton to Time, [place] to Place  Attention:Normal: No  Attention: Distractible  Thought Processes: Circumstantial (Insight improving.)  Thought Content:Normal: No  Thought Content: Other(See Comment) (Slightly disorganized, but mostly linear.)  Hallucinations: None, Other (Comment) (Denies, but is observed RTIS.)  Delusions: No  Delusions:  Other(See Comment) (Has been delusional in past, but did not voice delusional content this AM.)  Memory:Normal: No  Memory: Poor Recent  Insight and Judgment: No  Insight and Judgment: Poor Judgment, Poor Insight  Present Suicidal Ideation: No  Present Homicidal Ideation: No      Metabolic Screening:    Lab Results   Component Value Date    LABA1C 5.4 06/13/2021       Lab Results   Component Value Date    CHOL 198 06/13/2021     Lab Results   Component Value Date    TRIG 283 (H) 06/13/2021     Lab Results   Component Value Date    HDL 59 06/13/2021    HDL 48 06/11/2021     No components found for: [facility] EVALUATION AND TREATMENT [place]  Lab Results   Component Value Date    LABVLDL 57 06/13/2021    LABVLDL 32

## 2021-06-17 NOTE — FLOWSHEET NOTE
Group Therapy Note    Date: 6/17/2021  Start Time: 1300  End Time:  1400  Number of Participants: 8    Type of Group: Music Group    Notes:  Pt present for Music Group. Pt actively participated by making song selections and singing along to music. Participation Level:  Active Listener and Interactive    Participation Quality: Appropriate and Attentive      Speech:  normal      Affective Functioning: Congruent      Endings: None Reported    Modes of Intervention: Support, Socialization, Activity and Media      Discipline Responsible: Chaplain Tila Berry       06/17/21 2849   Encounter Summary   Services provided to: Patient   Continue Visiting   (6/17 Music Group)   Complexity of Encounter Moderate   Length of Encounter 1 hour

## 2021-07-28 NOTE — DISCHARGE SUMMARY
Discharge Summary   Admit Date: 6/12/2021   Discharge Date:  6/17/2021      Spent over 40 minutes with patient and staff on [facility]   Final Dx:   AXIS I:  1. Bipolar affective disorder, janny, severe with psychotic features. 2.  Alcohol abuse. 3.  Cannabis abuse. AXIS II:  Deferred. AXIS III:  Unremarkable. Condition on DC  Mood and affect are stable and pt is not suicidal   VITALS:  /80   Pulse 73   Temp 97.3 °F (36.3 °C) (Temporal)   Resp 16   Ht 5' 7\" (1.702 m)   Wt 188 lb 8 oz (85.5 kg)   SpO2 98%   BMI 29.52 kg/m²   Brief Summary Present Illness   A 70-year-old female with alcohol  intoxication and manic symptoms, presented to [facility] on  06/10/2021 for treatment. She was admitted. Again, was seen by Dr. Justina Khan MD, Psychiatry, 06/11/2021. At that time, he diagnosed  her with bipolar 1, janny with psychotic features, after she cleared  from her alcohol intoxication. Apparently, family called police with  concern and she arrived in a psychiatric hold. She appeared to be manic  in the ED, according to his report. Her thought processes reportedly  were disjointed and tangential.  She made statements that she thought  she was getting a large sum of money from the government for the work  she is doing for them, and lists a variety of random things she believes  she is doing for [employer] like arts and crafts. She also made a  mention of a report of multiple human beings out in society getting  blessed and that she needs to be blessed as well, but could not  elaborate. Apparently, she has not been sleeping well over the past 30  Days. Hospital Course Pt was admitted secondary to janny and psychosis after been evaluated at [facility] and treated for alcohol intoxication. Pt was started initially on abilify but switch to risperdal and seroquel since psychosis and janny did not appear to be improving. Pt also placed on tegretol for mood stabilization.  Pt remained grandiose and disorganized for most of the admission until sleep was regulated. Pt did tolerate meds and changes. Pt did tolerate medication regimen and once in outpt they might be able to simplify but due to severity of her sx's it was difficult to recompensate with monotherapy. Patient stabilized on meds and milieu treatment. Patient was discharged to home to continue recovery in the community.    PE: (reviewed) and labs (see medical H&PE)  Labs:    Admission on 06/12/2021, Discharged on 06/17/2021   Component Date Value Ref Range Status    Cholesterol, Total 06/13/2021 198  0 - 199 mg/dL Final    Triglycerides 06/13/2021 283* 0 - 150 mg/dL Final    HDL 06/13/2021 59  40 - 60 mg/dL Final    LDL Calculated 06/13/2021 82  <100 mg/dL Final    VLDL Cholesterol Calculated 06/13/2021 57  Not Established mg/dL Final    Hemoglobin A1C 06/13/2021 5.4  See comment % Final    Comment: Comment:  Diagnosis of Diabetes: > or = 6.5%  Increased risk of diabetes (Prediabetes): 5.7-6.4%  Glycemic Control: Nonpregnant Adults: <7.0%                    Pregnant: <6.0%        eAG 06/13/2021 108.3  mg/dL Final    TSH 06/13/2021 2.65  0.27 - 4.20 uIU/mL Final        Mental Status Exam at Discharge:  Level of consciousness:  awake  Appearance:  well-appearing, in chair, good grooming and good hygiene well-developed, well-nourished  Behavior/Motor:  no abnormalities noted normal gait and station AIMS: 0  Attitude toward examiner:  cooperative, attentive and good eye contact  Speech:  spontaneous, normal rate, normal volume and well articulated  Mood:  dysthymic  Affect:  mood congruent Anxiety: mild  Hallucinations: Absent  Thought processes:  coherent Attention span, Concentration & Attention:  attention span and concentration were age appropriate  Thought content:  Reality based no evidence of delusions OCD: none    Insight: normal insight and judgment Cognition:  oriented to person, place, and time  Fund of Knowledge: average  IQ:average Memory: intact  Suicide:  No specific plan to harm self  Sleep: sleeps through the night  Appetite: ok   Reassess Shila Risk:  no specific plan to harm self Pt has phone numbers to contact if suicidal thoughts recur and states pt will return to the hospital if suicidal feelings return. Hospital Routine Meds:     Hospital PRN Meds:    Discharge Meds:    Discharge Medication List as of 6/17/2021  2:01 PM           Details   OXcarbazepine (TRILEPTAL) 300 MG tablet Take 1 tablet by mouth 2 times daily, Disp-60 tablet, R-0Normal      QUEtiapine (SEROQUEL) 200 MG tablet Take 1 tablet by mouth nightly, Disp-30 tablet, R-0Normal      risperiDONE (RISPERDAL M-TABS) 3 MG disintegrating tablet Take 1 tablet by mouth nightly, Disp-30 tablet, R-0Normal      hydroCHLOROthiazide (HYDRODIURIL) 12.5 MG tablet Take 1 tablet by mouth daily, Disp-30 tablet, R-0Normal      pantoprazole (PROTONIX) 40 MG tablet Take 1 tablet by mouth every morning (before breakfast), Disp-30 tablet, R-0Normal              Details   cloNIDine (CATAPRES) 0.1 MG tablet Take 1 tablet by mouth 3 times daily, Disp-90 tablet, R-0Normal      metoprolol tartrate (LOPRESSOR) 25 MG tablet Take 1 tablet by mouth 2 times daily, Disp-60 tablet, R-0Normal              Details   ferrous sulfate (HOLLIE-LORY) 325 (65 FE) MG tablet Take 1 tablet by mouth 2 times daily. , Disp-30 tablet, R-0                 Disposition - Residence      Follow Up:  See Discharge Instructions

## 2021-09-17 ENCOUNTER — APPOINTMENT (OUTPATIENT)
Dept: CT IMAGING | Age: 36
End: 2021-09-17
Payer: MEDICARE

## 2021-09-17 ENCOUNTER — HOSPITAL ENCOUNTER (EMERGENCY)
Age: 36
Discharge: HOME | End: 2021-09-18
Attending: STUDENT IN AN ORGANIZED HEALTH CARE EDUCATION/TRAINING PROGRAM
Payer: MEDICARE

## 2021-09-17 ENCOUNTER — APPOINTMENT (OUTPATIENT)
Dept: GENERAL RADIOLOGY | Age: 36
End: 2021-09-17
Payer: MEDICARE

## 2021-09-17 VITALS
WEIGHT: 188 LBS | BODY MASS INDEX: 29.51 KG/M2 | TEMPERATURE: 96.9 F | OXYGEN SATURATION: 100 % | SYSTOLIC BLOOD PRESSURE: 139 MMHG | HEIGHT: 67 IN | RESPIRATION RATE: 17 BRPM | HEART RATE: 86 BPM | DIASTOLIC BLOOD PRESSURE: 100 MMHG

## 2021-09-17 DIAGNOSIS — R55 SYNCOPE, UNSPECIFIED SYNCOPE TYPE: Primary | ICD-10-CM

## 2021-09-17 DIAGNOSIS — F19.10 POLYSUBSTANCE ABUSE (HCC): ICD-10-CM

## 2021-09-17 LAB
A/G RATIO: 1.3 (ref 1.1–2.2)
ACETAMINOPHEN LEVEL: <5 UG/ML (ref 10–30)
ALBUMIN SERPL-MCNC: 4.4 G/DL (ref 3.4–5)
ALP BLD-CCNC: 59 U/L (ref 40–129)
ALT SERPL-CCNC: 15 U/L (ref 10–40)
ANION GAP SERPL CALCULATED.3IONS-SCNC: 13 MMOL/L (ref 3–16)
AST SERPL-CCNC: 22 U/L (ref 15–37)
BASOPHILS ABSOLUTE: 0 K/UL (ref 0–0.2)
BASOPHILS RELATIVE PERCENT: 0.7 %
BILIRUB SERPL-MCNC: <0.2 MG/DL (ref 0–1)
BUN BLDV-MCNC: 7 MG/DL (ref 7–20)
CALCIUM SERPL-MCNC: 9.6 MG/DL (ref 8.3–10.6)
CHLORIDE BLD-SCNC: 96 MMOL/L (ref 99–110)
CO2: 27 MMOL/L (ref 21–32)
CREAT SERPL-MCNC: 0.7 MG/DL (ref 0.6–1.1)
EOSINOPHILS ABSOLUTE: 0.1 K/UL (ref 0–0.6)
EOSINOPHILS RELATIVE PERCENT: 1.2 %
ETHANOL: 12 MG/DL (ref 0–0.08)
GFR AFRICAN AMERICAN: >60
GFR NON-AFRICAN AMERICAN: >60
GLOBULIN: 3.4 G/DL
GLUCOSE BLD-MCNC: 168 MG/DL (ref 70–99)
HCT VFR BLD CALC: 40.7 % (ref 36–48)
HEMOGLOBIN: 13.4 G/DL (ref 12–16)
LYMPHOCYTES ABSOLUTE: 2.6 K/UL (ref 1–5.1)
LYMPHOCYTES RELATIVE PERCENT: 43.4 %
MCH RBC QN AUTO: 26.8 PG (ref 26–34)
MCHC RBC AUTO-ENTMCNC: 32.8 G/DL (ref 31–36)
MCV RBC AUTO: 81.7 FL (ref 80–100)
MONOCYTES ABSOLUTE: 0.4 K/UL (ref 0–1.3)
MONOCYTES RELATIVE PERCENT: 7 %
NEUTROPHILS ABSOLUTE: 2.9 K/UL (ref 1.7–7.7)
NEUTROPHILS RELATIVE PERCENT: 47.7 %
PDW BLD-RTO: 14.1 % (ref 12.4–15.4)
PLATELET # BLD: 252 K/UL (ref 135–450)
PMV BLD AUTO: 8.9 FL (ref 5–10.5)
POTASSIUM REFLEX MAGNESIUM: 3.7 MMOL/L (ref 3.5–5.1)
RBC # BLD: 4.99 M/UL (ref 4–5.2)
SALICYLATE, SERUM: 1.3 MG/DL (ref 15–30)
SODIUM BLD-SCNC: 136 MMOL/L (ref 136–145)
TOTAL PROTEIN: 7.8 G/DL (ref 6.4–8.2)
TROPONIN: <0.01 NG/ML
WBC # BLD: 6.1 K/UL (ref 4–11)

## 2021-09-17 PROCEDURE — 93005 ELECTROCARDIOGRAM TRACING: CPT | Performed by: PHYSICIAN ASSISTANT

## 2021-09-17 PROCEDURE — 72125 CT NECK SPINE W/O DYE: CPT

## 2021-09-17 PROCEDURE — 99285 EMERGENCY DEPT VISIT HI MDM: CPT

## 2021-09-17 PROCEDURE — 82077 ASSAY SPEC XCP UR&BREATH IA: CPT

## 2021-09-17 PROCEDURE — 80053 COMPREHEN METABOLIC PANEL: CPT

## 2021-09-17 PROCEDURE — 2580000003 HC RX 258: Performed by: PHYSICIAN ASSISTANT

## 2021-09-17 PROCEDURE — 84484 ASSAY OF TROPONIN QUANT: CPT

## 2021-09-17 PROCEDURE — 80179 DRUG ASSAY SALICYLATE: CPT

## 2021-09-17 PROCEDURE — 85025 COMPLETE CBC W/AUTO DIFF WBC: CPT

## 2021-09-17 PROCEDURE — 71045 X-RAY EXAM CHEST 1 VIEW: CPT

## 2021-09-17 PROCEDURE — 70450 CT HEAD/BRAIN W/O DYE: CPT

## 2021-09-17 PROCEDURE — 80143 DRUG ASSAY ACETAMINOPHEN: CPT

## 2021-09-17 RX ORDER — 0.9 % SODIUM CHLORIDE 0.9 %
1000 INTRAVENOUS SOLUTION INTRAVENOUS ONCE
Status: COMPLETED | OUTPATIENT
Start: 2021-09-17 | End: 2021-09-17

## 2021-09-17 RX ADMIN — SODIUM CHLORIDE 1000 ML: 9 INJECTION, SOLUTION INTRAVENOUS at 21:15

## 2021-09-18 LAB
AMPHETAMINE SCREEN, URINE: ABNORMAL
BARBITURATE SCREEN URINE: ABNORMAL
BENZODIAZEPINE SCREEN, URINE: ABNORMAL
CANNABINOID SCREEN URINE: POSITIVE
COCAINE METABOLITE SCREEN URINE: ABNORMAL
EKG ATRIAL RATE: 89 BPM
EKG DIAGNOSIS: NORMAL
EKG P AXIS: 62 DEGREES
EKG P-R INTERVAL: 154 MS
EKG Q-T INTERVAL: 372 MS
EKG QRS DURATION: 88 MS
EKG QTC CALCULATION (BAZETT): 452 MS
EKG R AXIS: 65 DEGREES
EKG T AXIS: 71 DEGREES
EKG VENTRICULAR RATE: 89 BPM
Lab: ABNORMAL
METHADONE SCREEN, URINE: ABNORMAL
OPIATE SCREEN URINE: ABNORMAL
OXYCODONE URINE: ABNORMAL
PH UA: 6
PHENCYCLIDINE SCREEN URINE: ABNORMAL
PROPOXYPHENE SCREEN: ABNORMAL

## 2021-09-18 PROCEDURE — 80307 DRUG TEST PRSMV CHEM ANLYZR: CPT

## 2021-09-18 PROCEDURE — 93010 ELECTROCARDIOGRAM REPORT: CPT | Performed by: INTERNAL MEDICINE

## 2021-09-18 ASSESSMENT — ENCOUNTER SYMPTOMS
DIARRHEA: 0
ABDOMINAL PAIN: 0
SHORTNESS OF BREATH: 0
VOMITING: 0
NAUSEA: 0

## 2021-09-18 ASSESSMENT — VISUAL ACUITY: OU: 1

## 2021-09-18 NOTE — ED PROVIDER NOTES
I independently performed a history and physical on Felipe Paulson. All diagnostic, treatment, and disposition decisions were made by myself in conjunction with the advanced practice provider. Briefly, this is a 28 y.o. female here for syncopal episode while she was out drinking tonight. She is brought in by her father who states that he is highly suspicious that she did \"dope\". Apparently, she woke up after Narcan was given by EMS. She will not confirm or deny this. She has a history of schizophrenia which her father states has been difficult to control however currently does not believe that she is going to harm herself or others. She was recently admitted to [facility] for schizophrenia and her medications were readjusted. She denies any SI or HI. He feels comfortable taking her to her outpatient psychiatry appointments. On exam pt is resting comfortably  Pupils 2-1 bilaterally. Cardiac RRR, no murmur  Lungs clear bilaterally, no increased work of breathing  Abdomen soft nontender  Ambulates steadily to the restroom. No extremity drift. She is alert and answering questions appropriately. EKG  The Ekg interpreted by me in the absence of a cardiologist shows. normal sinus rhythm with a rate of 89  Axis is   Normal  QTc is  normal  Intervals and Durations are unremarkable. No specific ST-T wave changes appreciated. No evidence of acute ischemia.    No significant change from prior EKG dated 6/10/21    XR CHEST PORTABLE   Final Result   Negative portable chest.         CT Head WO Contrast    (Results Pending)   CT Cervical Spine WO Contrast    (Results Pending)     Labs Reviewed   COMPREHENSIVE METABOLIC PANEL W/ REFLEX TO MG FOR LOW K - Abnormal; Notable for the following components:       Result Value    Chloride 96 (*)     Glucose 168 (*)     All other components within normal limits    Narrative:     Performed at:  [facility]  [address] [place] [address] [place]   Phone [phone]   SALICYLATE LEVEL - Abnormal; Notable for the following components:    Salicylate, Serum 1.3 (*)     All other components within normal limits    Narrative:     Performed at:  [facility]  [address], [address]   Phone [phone]   ACETAMINOPHEN LEVEL - Abnormal; Notable for the following components:    Acetaminophen Level <5 (*)     All other components within normal limits    Narrative:     Performed at:  [facility] [address], [employer]   Phone [phone]   CBC WITH AUTO DIFFERENTIAL    Narrative:     Performed at:  [facility]  [employer]   Phone [phone]   ETHANOL    Narrative:     Performed at:  [facility]  [address]   Phone [phone]   TROPONIN    Narrative:     Performed at:  [facility] [address]   Phone [phone]   URINE DRUG SCREEN     Medications   0.9 % sodium chloride bolus (1,000 mLs IntraVENous New Bag 9/17/21 2114)       Patient is 26-year-old female presenting to the emergency room for syncopal episode. History of polysubstance abuse and apparently she woke up with Narcan given by EMS. On arrival to the emergency room she is hemodynamically stable. Her father is at bedside who states that he does not believe she is a threat to herself or others acutely. She is schizophrenic, and  currently denying any SI or HI. There is no traumatic injuries on CT head or C-spine. Her laboratory studies are unremarkable here. Her father asks to see a urine tox which she agrees to let him see today. She does not appear acutely decompensated from a psychiatric standpoint today. Likely syncope related to opiate abuse.   She will be observed for over 90 minutes status

## 2021-09-18 NOTE — ED PROVIDER NOTES
[facility]        Pt Name: Cuba Metcalf  MRN: [id number]  [place] 1985  Date of evaluation: 9/17/2021  Provider: Dang Akers  PCP: No primary care provider on file. Note Started: 9:58 PM EDT        I have seen and evaluated this patient with my supervising physician No att. providers found. CHIEF COMPLAINT       Chief Complaint   Patient presents with    Altered Mental Status     pt brought in by ems from a bar. pt felt altered and was given 4mg Narcan but did not help much. pt answerring questions apropriately but speech slow, also diaphoretic. denies SI or HI. HISTORY OF PRESENT ILLNESS   (Location, Timing/Onset, Context/Setting, Quality, Duration, Modifying Factors, Severity, Associated Signs and Symptoms)  Note limiting factors. Chief Complaint: syncope     Cuba Metcalf is a 28 y.o. female who presents to the emergency department for evaluation of syncope. Patient was sitting at a bar and had just drank a shot of alcohol. Patient states she had drank some beer at home prior to going to the bar. Patient states then she was told that she passed out. She does not remember what happened. She denies any drug use today. Patient states she was just discharged from a hospital after getting her psychiatric medications \"tweaked\". Patient denies any homicidal suicidal ideation. She denies any recent illness or fever. Denies taking any over-the-counter medications. Denies taking increased prescription medications past what she has been prescribed. Patient denies any chest pain, shortness of breath, abdominal pain, nausea vomiting or diarrhea. Patient states she does not feel lightheaded or dizzy currently. Nursing Notes were all reviewed and agreed with or any disagreements were addressed in the HPI.     REVIEW OF SYSTEMS    (2-9 systems for level 4, 10 or more for level 5)     Review of Systems Constitutional: Negative for fatigue and fever. HENT: Negative. Eyes: Negative for visual disturbance. Respiratory: Negative for shortness of breath. Cardiovascular: Negative for chest pain. Gastrointestinal: Negative for abdominal pain, diarrhea, nausea and vomiting. Genitourinary: Negative. Musculoskeletal: Negative. Skin: Negative. Neurological: Positive for syncope. Positives and Pertinent negatives as per HPI. Except as noted above in the ROS, all other systems were reviewed and negative. PAST MEDICAL HISTORY     Past Medical History:   Diagnosis Date    Alcohol abuse 6/13/2021    Anemia     Dysfunctional uterine bleeding 11/26/2013    Hx of gallstones 9/2012    Hypertension     Irregular uterine bleeding          SURGICAL HISTORY     Past Surgical History:   Procedure Laterality Date    DILATION AND CURETTAGE OF UTERUS  2004         [address]       Discharge Medication List as of 9/18/2021  2:07 AM      CONTINUE these medications which have NOT CHANGED    Details   OXcarbazepine (TRILEPTAL) 300 MG tablet Take 1 tablet by mouth 2 times daily, Disp-60 tablet, R-0Normal      cloNIDine (CATAPRES) 0.1 MG tablet Take 1 tablet by mouth 3 times daily, Disp-90 tablet, R-0Normal      QUEtiapine (SEROQUEL) 200 MG tablet Take 1 tablet by mouth nightly, Disp-30 tablet, R-0Normal      risperiDONE (RISPERDAL M-TABS) 3 MG disintegrating tablet Take 1 tablet by mouth nightly, Disp-30 tablet, R-0Normal      metoprolol tartrate (LOPRESSOR) 25 MG tablet Take 1 tablet by mouth 2 times daily, Disp-60 tablet, R-0Normal      hydroCHLOROthiazide (HYDRODIURIL) 12.5 MG tablet Take 1 tablet by mouth daily, Disp-30 tablet, R-0Normal      pantoprazole (PROTONIX) 40 MG tablet Take 1 tablet by mouth every morning (before breakfast), Disp-30 tablet, R-0Normal      ferrous sulfate (HOLLIE-LORY) 325 (65 FE) MG tablet Take 1 tablet by mouth 2 times daily. , Disp-30 tablet, R-0 ALLERGIES     Haloperidol and related and Pineapple    FAMILYHISTORY       Family History   Problem Relation Age of Onset    High Blood Pressure Mother     High Blood Pressure Sister     Rheum Arthritis Neg Hx     Osteoarthritis Neg Hx     Asthma Neg Hx     Breast Cancer Neg Hx     Cancer Neg Hx     Diabetes Neg Hx     Heart Failure Neg Hx     High Cholesterol Neg Hx     Hypertension Neg Hx     Migraines Neg Hx     Ovarian Cancer Neg Hx     Rashes/Skin Problems Neg Hx     Seizures Neg Hx     Stroke Neg Hx     Thyroid Disease Neg Hx           SOCIAL HISTORY       Social History     Tobacco Use    Smoking status: Current Every Day Smoker     Packs/day: 0.50     Years: 3.00     Pack years: 1.50     Types: Cigarettes    Smokeless tobacco: Never Used    Tobacco comment: smoking cessation 12/31/13, smoking cess 9/14   Vaping Use    Vaping Use: Never used   Substance Use Topics    Alcohol use: No    Drug use: No       SCREENINGS             PHYSICAL EXAM    (up to 7 for level 4, 8 or more for level 5)     ED Triage Vitals   BP Temp Temp Source Pulse Resp SpO2 Height Weight   09/17/21 2030 09/17/21 2031 09/17/21 2031 09/17/21 2030 09/17/21 2030 09/17/21 2030 09/17/21 2031 09/17/21 2031   125/74 96.9 °F (36.1 °C) Oral 107 16 96 % 5' 7\" (1.702 m) 188 lb (85.3 kg)       Physical Exam  Vitals and nursing note reviewed. Constitutional:       General: She is not in acute distress. Appearance: She is well-developed. She is not ill-appearing, toxic-appearing or diaphoretic. HENT:      Head: Normocephalic and atraumatic. Nose: Nose normal.   Eyes:      General: Lids are normal. Vision grossly intact. Right eye: No discharge. Left eye: No discharge. Comments: Miotic pupils   Cardiovascular:      Rate and Rhythm: Normal rate and regular rhythm. Heart sounds: Normal heart sounds. No murmur heard. No gallop.     Pulmonary:      Effort: Pulmonary effort is normal. No respiratory distress. Breath sounds: Normal breath sounds. No wheezing or rales. Musculoskeletal:         General: Normal range of motion. Cervical back: Normal range of motion and neck supple. Skin:     General: Skin is warm and dry. Coloration: Skin is not pale. Neurological:      Mental Status: She is alert and oriented to person, place, and time.    Psychiatric:         Behavior: Behavior normal.         DIAGNOSTIC RESULTS   LABS:    Labs Reviewed   COMPREHENSIVE METABOLIC PANEL W/ REFLEX TO MG FOR LOW K - Abnormal; Notable for the following components:       Result Value    Chloride 96 (*)     Glucose 168 (*)     All other components within normal limits    Narrative:     Performed at:  [facility] [address], [place]   Phone [phone]   [address] - Abnormal; Notable for the following components:    Cannabinoid Scrn, Ur POSITIVE (*)     All other components within normal limits    Narrative:     Performed at:  [facility] [address], [place]   Phone [phone]   SALICYLATE LEVEL - Abnormal; Notable for the following components:    Salicylate, Serum 1.3 (*)     All other components within normal limits    Narrative:     Performed at:  [facility] [address], [place]   Phone [phone]   ACETAMINOPHEN LEVEL - Abnormal; Notable for the following components:    Acetaminophen Level <5 (*)     All other components within normal limits    Narrative:     Performed at:  [facility] [address], [place]   Phone [phone]   CBC WITH AUTO DIFFERENTIAL    Narrative:     Performed at:  [facility] [address], [place]   Phone [phone]   ETHANOL    Narrative:     Performed at:  [facility]  [address], [place]   Phone [phone]   TROPONIN    Narrative:     Performed at:  [facility]  [address], [place]   Phone [phone]       When ordered only abnormal lab results are displayed. All other labs were within normal range or not returned as of this dictation. EKG: When ordered, EKG's are interpreted by the Emergency Department Physician in the absence of a cardiologist.  Please see their note for interpretation of EKG. RADIOLOGY:   Non-plain film images such as CT, Ultrasound and MRI are read by the radiologist. Plain radiographic images are visualized and preliminarily interpreted by the ED Provider with the below findings:        Interpretation per the Radiologist below, if available at the time of this note:    CT Cervical Spine WO Contrast   Final Result   No evidence of an acute fracture or traumatic malalignment involving the   cervical spine. CT Head WO Contrast   Final Result   No acute intracranial abnormality. XR CHEST PORTABLE   Final Result   Negative portable chest.           No results found. PROCEDURES   Unless otherwise noted below, none     Procedures    CRITICAL CARE TIME   N/A    CONSULTS:  None      EMERGENCY DEPARTMENT COURSE and DIFFERENTIAL DIAGNOSIS/MDM:   Vitals:    Vitals:    09/17/21 2315 09/17/21 2330 09/17/21 2345 09/17/21 2349   BP: 128/82 (!) 141/94 (!) 139/100    Pulse: 88 87 104 86   Resp: 13 16 17    Temp:       TempSrc:       SpO2: 100% 100% 100%    Weight:       Height:           Patient was given the following medications:  Medications   0.9 % sodium chloride bolus (0 mLs IntraVENous Stopped 9/17/21 2300)         Patient presents emergency department for evaluation of syncopal episode in a bar. Patient admits to drinking alcohol prior to episode.  Patient states she does not have any chest pain, shortness of breath, abdominal pain, nausea vomiting or diarrhea. Patient denies drug use today but father states that she does have a history of using \"dope\". Patient's pupils are pinpoint and patient was given Narcan at the scene. Patient did wake up slightly after Narcan administration. Patient is currently awake and alert and able to ambulate in the emergency department without difficulty. She has no focal neurologic deficit on examination. Moves all 4 extremities normal strength and coordination. No pronator drift. Normal strength to plantarflexion, dorsiflexion. Negative test of skew. Heart rate is regular with murmurs or gallops. Lungs clear to auscultation bilaterally. Abdomen is soft nontender without rebound or guarding. Patient is given a liter of fluids. Patient is not anemic. No leukocytosis. Patient's ethanol level is 12. Troponin is undetectable. EKG shows no acute ST changes. Drug screen is positive for cannabis. Patient again is able to ambulate here in the emergency department without difficulty. She'll be discharged home. Patient may have had adverse reaction with alcohol and her psychiatric medications plus illicit substances. Low concern for stroke, meningitis, encephalitis, coronary artery disease, pulmonary embolism, pneumonia, pneumothorax  Sepsis, or other acute process that would require acute further management. Patient has a normal repeat neurological exam. At this time there is no indication of head injury, encephalopathy, multiple sclerosis, TIA/CVA, seizures, dehydration, hypoglycemia, mass lesions, metabolic cause, cardiac arrhythmia, PE, GI bleeding or orthostatic hypotension. Patient is stable throughout ED course and safe for outpatient follow-up. Patient made aware of treatment plan and verbally understood and agreed. Patient stable for outpatient follow-up with their family doctor in 24-48 hours. FINAL IMPRESSION      1. Syncope, unspecified syncope type    2.  Polysubstance abuse ([facility] [address]          DISPOSITION/PLAN   DISPOSITION Decision To Discharge 09/17/2021 11:16:30 PM      PATIENT REFERRED TO:  No follow-up provider specified.     DISCHARGE MEDICATIONS:  Discharge Medication List as of 9/18/2021  2:07 AM          DISCONTINUED MEDICATIONS:  Discharge Medication List as of 9/18/2021  2:07 AM                 (Please note that portions of this note were completed with a voice recognition program.  Efforts were made to edit the dictations but occasionally words are mis-transcribed.)    Rocael Bowman PA-C (electronically signed)            Rocael Bowman PA-C  09/18/21 2958

## 2021-09-18 NOTE — ED NOTES
Bed: 22  Expected date:   Expected time:   Means of arrival:   Comments:  christine Albright, [facility]  09/17/21 2029

## 2021-10-09 ENCOUNTER — HOSPITAL ENCOUNTER (EMERGENCY)
Age: 36
Discharge: HOME | End: 2021-10-09
Payer: MEDICARE

## 2021-10-09 ENCOUNTER — APPOINTMENT (OUTPATIENT)
Dept: GENERAL RADIOLOGY | Age: 36
End: 2021-10-09
Payer: MEDICARE

## 2021-10-09 VITALS
OXYGEN SATURATION: 95 % | HEART RATE: 73 BPM | SYSTOLIC BLOOD PRESSURE: 122 MMHG | RESPIRATION RATE: 14 BRPM | DIASTOLIC BLOOD PRESSURE: 94 MMHG | TEMPERATURE: 98.1 F

## 2021-10-09 DIAGNOSIS — R53.1 GENERAL WEAKNESS: Primary | ICD-10-CM

## 2021-10-09 LAB
A/G RATIO: 1.3 (ref 1.1–2.2)
ALBUMIN SERPL-MCNC: 4.1 G/DL (ref 3.4–5)
ALP BLD-CCNC: 56 U/L (ref 40–129)
ALT SERPL-CCNC: 9 U/L (ref 10–40)
AMPHETAMINE SCREEN, URINE: ABNORMAL
ANION GAP SERPL CALCULATED.3IONS-SCNC: 13 MMOL/L (ref 3–16)
AST SERPL-CCNC: 13 U/L (ref 15–37)
BARBITURATE SCREEN URINE: ABNORMAL
BASOPHILS ABSOLUTE: 0 K/UL (ref 0–0.2)
BASOPHILS RELATIVE PERCENT: 0.8 %
BENZODIAZEPINE SCREEN, URINE: ABNORMAL
BILIRUB SERPL-MCNC: <0.2 MG/DL (ref 0–1)
BILIRUBIN URINE: NEGATIVE
BLOOD, URINE: NEGATIVE
BUN BLDV-MCNC: 13 MG/DL (ref 7–20)
CALCIUM SERPL-MCNC: 9.5 MG/DL (ref 8.3–10.6)
CANNABINOID SCREEN URINE: POSITIVE
CHLORIDE BLD-SCNC: 100 MMOL/L (ref 99–110)
CLARITY: ABNORMAL
CO2: 27 MMOL/L (ref 21–32)
COCAINE METABOLITE SCREEN URINE: ABNORMAL
COLOR: YELLOW
CREAT SERPL-MCNC: 0.6 MG/DL (ref 0.6–1.1)
EOSINOPHILS ABSOLUTE: 0.1 K/UL (ref 0–0.6)
EOSINOPHILS RELATIVE PERCENT: 2.2 %
EPITHELIAL CELLS, UA: 2 /HPF (ref 0–5)
ETHANOL: 41 MG/DL (ref 0–0.08)
GFR AFRICAN AMERICAN: >60
GFR NON-AFRICAN AMERICAN: >60
GLOBULIN: 3.2 G/DL
GLUCOSE BLD-MCNC: 97 MG/DL (ref 70–99)
GLUCOSE URINE: NEGATIVE MG/DL
HCG QUALITATIVE: NEGATIVE
HCT VFR BLD CALC: 36.2 % (ref 36–48)
HEMOGLOBIN: 12.3 G/DL (ref 12–16)
HYALINE CASTS: 0 /LPF (ref 0–8)
KETONES, URINE: NEGATIVE MG/DL
LEUKOCYTE ESTERASE, URINE: ABNORMAL
LYMPHOCYTES ABSOLUTE: 2.2 K/UL (ref 1–5.1)
LYMPHOCYTES RELATIVE PERCENT: 42.3 %
Lab: ABNORMAL
MAGNESIUM: 2.1 MG/DL (ref 1.8–2.4)
MCH RBC QN AUTO: 27.1 PG (ref 26–34)
MCHC RBC AUTO-ENTMCNC: 34 G/DL (ref 31–36)
MCV RBC AUTO: 79.8 FL (ref 80–100)
METHADONE SCREEN, URINE: ABNORMAL
MICROSCOPIC EXAMINATION: YES
MONOCYTES ABSOLUTE: 0.5 K/UL (ref 0–1.3)
MONOCYTES RELATIVE PERCENT: 8.6 %
NEUTROPHILS ABSOLUTE: 2.4 K/UL (ref 1.7–7.7)
NEUTROPHILS RELATIVE PERCENT: 46.1 %
NITRITE, URINE: NEGATIVE
OPIATE SCREEN URINE: ABNORMAL
OXYCODONE URINE: ABNORMAL
PDW BLD-RTO: 14.7 % (ref 12.4–15.4)
PH UA: 6
PH UA: 6 (ref 5–8)
PHENCYCLIDINE SCREEN URINE: ABNORMAL
PLATELET # BLD: 212 K/UL (ref 135–450)
PMV BLD AUTO: 8.1 FL (ref 5–10.5)
POTASSIUM REFLEX MAGNESIUM: 4 MMOL/L (ref 3.5–5.1)
PROPOXYPHENE SCREEN: ABNORMAL
PROTEIN UA: NEGATIVE MG/DL
RBC # BLD: 4.53 M/UL (ref 4–5.2)
RBC UA: 2 /HPF (ref 0–4)
SODIUM BLD-SCNC: 140 MMOL/L (ref 136–145)
SPECIFIC GRAVITY UA: 1.01 (ref 1–1.03)
TOTAL PROTEIN: 7.3 G/DL (ref 6.4–8.2)
URINE REFLEX TO CULTURE: YES
URINE TYPE: ABNORMAL
UROBILINOGEN, URINE: 0.2 E.U./DL
WBC # BLD: 5.3 K/UL (ref 4–11)
WBC UA: 14 /HPF (ref 0–5)

## 2021-10-09 PROCEDURE — 93005 ELECTROCARDIOGRAM TRACING: CPT | Performed by: EMERGENCY MEDICINE

## 2021-10-09 PROCEDURE — 85025 COMPLETE CBC W/AUTO DIFF WBC: CPT

## 2021-10-09 PROCEDURE — 80053 COMPREHEN METABOLIC PANEL: CPT

## 2021-10-09 PROCEDURE — 87086 URINE CULTURE/COLONY COUNT: CPT

## 2021-10-09 PROCEDURE — 2580000003 HC RX 258: Performed by: PHYSICIAN ASSISTANT

## 2021-10-09 PROCEDURE — 71045 X-RAY EXAM CHEST 1 VIEW: CPT

## 2021-10-09 PROCEDURE — 99284 EMERGENCY DEPT VISIT MOD MDM: CPT

## 2021-10-09 PROCEDURE — 36415 COLL VENOUS BLD VENIPUNCTURE: CPT

## 2021-10-09 PROCEDURE — 80307 DRUG TEST PRSMV CHEM ANLYZR: CPT

## 2021-10-09 PROCEDURE — 83735 ASSAY OF MAGNESIUM: CPT

## 2021-10-09 PROCEDURE — 84703 CHORIONIC GONADOTROPIN ASSAY: CPT

## 2021-10-09 PROCEDURE — 82077 ASSAY SPEC XCP UR&BREATH IA: CPT

## 2021-10-09 PROCEDURE — 81001 URINALYSIS AUTO W/SCOPE: CPT

## 2021-10-09 RX ORDER — 0.9 % SODIUM CHLORIDE 0.9 %
1000 INTRAVENOUS SOLUTION INTRAVENOUS ONCE
Status: COMPLETED | OUTPATIENT
Start: 2021-10-09 | End: 2021-10-09

## 2021-10-09 RX ADMIN — SODIUM CHLORIDE 1000 ML: 9 INJECTION, SOLUTION INTRAVENOUS at 14:19

## 2021-10-09 NOTE — ED PROVIDER NOTES
[facility]        Pt Name: Maribell Zuluaga  MRN: [id number]  [place] 1985  Date of evaluation: 10/9/2021  Provider: Waldo Conley PA-C  PCP: No primary care provider on file. Note Started: 2:05 PM EDT       MUSA. I have evaluated this patient. My supervising physician was available for consultation. CHIEF COMPLAINT       Chief Complaint   Patient presents with    Drug Overdose     patient arrives via [place] ems from home, found by family in kitchen unresponsive, 2 IN, 1 IV narcan, patient alert and oriented, denies drug use, denies SI/HI       HISTORY OF PRESENT ILLNESS   (Location, Timing/Onset, Context/Setting, Quality, Duration, Modifying Factors, Severity, Associated Signs and Symptoms)  Note limiting factors. Chief Complaint: General weakness    Maribell Zuluaga is a 28 y.o. female who presents to the emergency department after she was found unresponsive. She received Narcan and is now alert and oriented. Patient denies any pain. Denies suicidal homicidal ideation. Has history of hypertension and schizophrenia. Patient states she just feels generally fatigued. She is alert and oriented x4. She denies headache, visual changes, nausea, vomiting, chest pain, shortness breath, abdominal pain, difficulty moving her extremities, numbness. She is adamant and declines any drug use or taking more of her medicine than she should. She states she was getting ready to open a beer when she just woke up in the squad next. She states that she felt very \"sleepy\" a couple days ago also. She denies any other symptoms. Nursing Notes were all reviewed and agreed with or any disagreements were addressed in the HPI. REVIEW OF SYSTEMS    (2-9 systems for level 4, 10 or more for level 5)     Review of Systems    Positives and Pertinent negatives as per HPI.  Except as noted above in the ROS, all other systems were reviewed and negative. PAST MEDICAL HISTORY     Past Medical History:   Diagnosis Date    Alcohol abuse 6/13/2021    Anemia     Dysfunctional uterine bleeding 11/26/2013    Hx of gallstones 9/2012    Hypertension     Irregular uterine bleeding          SURGICAL HISTORY     Past Surgical History:   Procedure Laterality Date    DILATION AND CURETTAGE OF UTERUS  2004         CURRENTMEDICATIONS       Previous Medications    CLONIDINE (CATAPRES) 0.1 MG TABLET    Take 1 tablet by mouth 3 times daily    FERROUS SULFATE (HOLLIE-LORY) 325 (65 FE) MG TABLET    Take 1 tablet by mouth 2 times daily.     HYDROCHLOROTHIAZIDE (HYDRODIURIL) 12.5 MG TABLET    Take 1 tablet by mouth daily    METOPROLOL TARTRATE (LOPRESSOR) 25 MG TABLET    Take 1 tablet by mouth 2 times daily    OXCARBAZEPINE (TRILEPTAL) 300 MG TABLET    Take 1 tablet by mouth 2 times daily    PANTOPRAZOLE (PROTONIX) 40 MG TABLET    Take 1 tablet by mouth every morning (before breakfast)    QUETIAPINE (SEROQUEL) 200 MG TABLET    Take 1 tablet by mouth nightly    RISPERIDONE (RISPERDAL M-TABS) 3 MG DISINTEGRATING TABLET    Take 1 tablet by mouth nightly         ALLERGIES     Haloperidol and related and Pineapple    FAMILYHISTORY       Family History   Problem Relation Age of Onset    High Blood Pressure Mother     High Blood Pressure Sister     Rheum Arthritis Neg Hx     Osteoarthritis Neg Hx     Asthma Neg Hx     Breast Cancer Neg Hx     Cancer Neg Hx     Diabetes Neg Hx     Heart Failure Neg Hx     High Cholesterol Neg Hx     Hypertension Neg Hx     Migraines Neg Hx     Ovarian Cancer Neg Hx     Rashes/Skin Problems Neg Hx     Seizures Neg Hx     Stroke Neg Hx     Thyroid Disease Neg Hx           SOCIAL HISTORY       Social History     Tobacco Use    Smoking status: Current Every Day Smoker     Packs/day: 0.50     Years: 3.00     Pack years: 1.50     Types: Cigarettes    Smokeless tobacco: Never Used    Tobacco comment: smoking cessation 12/31/13, smoking cess 9/14   Vaping Use    Vaping Use: Never used   Substance Use Topics    Alcohol use: No    Drug use: No       SCREENINGS    Denisa Coma Scale  Eye Opening: Spontaneous  Best Verbal Response: Oriented  Best Motor Response: Obeys commands  [place] Coma Scale Score: 15        PHYSICAL EXAM    (up to 7 for level 4, 8 or more for level 5)     ED Triage Vitals [10/09/21 1336]   BP Temp Temp src Pulse Resp SpO2 Height Weight   114/69 98.1 °F (36.7 °C) -- 97 14 95 % -- --       Physical Exam  Vitals and nursing note reviewed. Constitutional:       Appearance: She is well-developed. She is not diaphoretic. HENT:      Head: Atraumatic. Nose: Nose normal.   Eyes:      General:         Right eye: No discharge. Left eye: No discharge. Extraocular Movements: Extraocular movements intact. Pupils: Pupils are equal, round, and reactive to light. Cardiovascular:      Rate and Rhythm: Normal rate and regular rhythm. Heart sounds: No murmur heard. No friction rub. No gallop. Pulmonary:      Effort: Pulmonary effort is normal. No respiratory distress. Breath sounds: No stridor. No wheezing, rhonchi or rales. Abdominal:      General: Bowel sounds are normal. There is no distension. Palpations: Abdomen is soft. There is no mass. Tenderness: There is no abdominal tenderness. There is no guarding or rebound. Hernia: No hernia is present. Musculoskeletal:         General: No swelling. Normal range of motion. Cervical back: Normal range of motion. Skin:     General: Skin is warm and dry. Findings: No erythema or rash. Neurological:      Mental Status: She is alert and oriented to person, place, and time. Cranial Nerves: No cranial nerve deficit.    Psychiatric:         Behavior: Behavior normal.         DIAGNOSTIC RESULTS   LABS:    Labs Reviewed   CBC WITH AUTO DIFFERENTIAL - Abnormal; Notable for the following components:       Result Value MCV 79.8 (*)     All other components within normal limits    Narrative:     Performed at:  [facility] [address]   Phone [phone]   COMPREHENSIVE METABOLIC PANEL W/ REFLEX TO MG FOR LOW K - Abnormal; Notable for the following components:    ALT 9 (*)     AST 13 (*)     All other components within normal limits    Narrative:     Performed at:  [facility] [address]   Phone [phone]   URINE RT REFLEX TO CULTURE - Abnormal; Notable for the following components:    Clarity, UA CLOUDY (*)     Leukocyte Esterase, Urine SMALL (*)     All other components within normal limits    Narrative:     Performed at:  [facility] [address]   Phone [phone]   [address] - Abnormal; Notable for the following components:    Cannabinoid Scrn, Ur POSITIVE (*)     All other components within normal limits    Narrative:     Performed at:  [facility] [address]   Phone [phone]   MICROSCOPIC URINALYSIS - Abnormal; Notable for the following components:    WBC, UA 14 (*)     All other components within normal limits    Narrative:     Performed at:  [facility] [address]   Phone [phone]   CULTURE, URINE   MAGNESIUM    Narrative:     Performed at:  [facility] [address]   Phone [phone]   HCG, SERUM, QUALITATIVE    Narrative:     Performed at:  [facility]  [place]   Phone [phone]   ETHANOL    Narrative:     Performed at:  [facility]  [place]   Phone [phone]       When ordered only abnormal lab results are displayed. All other labs were within normal range or not returned as of this dictation. EKG: When ordered, EKG's are interpreted by the Emergency Department Physician in the absence of a cardiologist.  Please see their note for interpretation of EKG. RADIOLOGY:   Non-plain film images such as CT, Ultrasound and MRI are read by the radiologist. Plain radiographic images are visualized and preliminarily interpreted by the ED Provider with the below findings:        Interpretation per the Radiologist below, if available at the time of this note:    XR CHEST PORTABLE   Final Result   No acute process. Bibasilar hypoaeration           No results found. PROCEDURES   Unless otherwise noted below, none     Procedures    CRITICAL CARE TIME   N/A    CONSULTS:  None      EMERGENCY DEPARTMENT COURSE and DIFFERENTIAL DIAGNOSIS/MDM:   Vitals:    Vitals:    10/09/21 1415 10/09/21 1500 10/09/21 1530 10/09/21 1545   BP: 106/70 110/81 (!) 118/95 (!) 122/94   Pulse: 87 81 80 73   Resp: 15 13 18 14   Temp:       SpO2: 97% 97% 98% 95%       Patient was given the following medications:  Medications   0.9 % sodium chloride bolus (0 mLs IntraVENous Stopped 10/9/21 1520)           Patient presented with complaints of general weakness. Apparently was unresponsive and then woke up to Narcan in the field. Patient has been alert here on observation for over 3 hours. She has been drinking and walking the halls to the bathroom. She continues to deny any symptoms. She does have some white blood cells in her urine but denies any dysuria, urinary frequency or any urinary symptoms. Will await culture to treat. Has no sign of head injury. She denies chest pain, shortness of breath. She is continue adamant that she does not use illicit drugs or any narcotics. She does have some ethanol in her system has history of alcohol abuse. Drug screen positive for marijuana. She denies any other symptoms.   Do not believe any further work-up or testing is warranted at this time. She was referred to no PCP number. Return here for any worsening symptoms or problems at home. FINAL IMPRESSION      1.  General weakness          DISPOSITION/PLAN   DISPOSITION Decision To Discharge 10/09/2021 04:46:46 PM      PATIENT REFERRED TO:  [facility] Emergency Department  [address] [facility]  [phone]    As needed      DISCHARGE MEDICATIONS:  New Prescriptions    No medications on file       DISCONTINUED MEDICATIONS:  Discontinued Medications    No medications on file              (Please note that portions of this note were completed with a voice recognition program.  Efforts were made to edit the dictations but occasionally words are mis-transcribed.)    Othella Lennox, PA-C (electronically signed)            Othella Lennox, PA-C  10/09/21 8508

## 2021-10-09 NOTE — ED PROVIDER NOTES
I did not perform history or physical on Debbie Massed. This patient was seen independently by an MUSA. I did review the EKG, which is documented below. EKG  The Ekg interpreted by me in the absence of a cardiologist shows. normal sinus rhythm with a rate of 96  Axis is   Normal  QTc is  Prolonged QT  Intervals and Durations are unremarkable. No specific ST-T wave changes appreciated. No evidence of acute ischemia.    No significant change from prior EKG dated 9/17/2021           Kristin Macdonald MD  10/09/21 9852

## 2021-10-10 LAB
EKG ATRIAL RATE: 96 BPM
EKG DIAGNOSIS: NORMAL
EKG P AXIS: 49 DEGREES
EKG P-R INTERVAL: 168 MS
EKG Q-T INTERVAL: 372 MS
EKG QRS DURATION: 86 MS
EKG QTC CALCULATION (BAZETT): 469 MS
EKG R AXIS: 41 DEGREES
EKG T AXIS: 61 DEGREES
EKG VENTRICULAR RATE: 96 BPM
URINE CULTURE, ROUTINE: NORMAL

## 2021-10-10 PROCEDURE — 93010 ELECTROCARDIOGRAM REPORT: CPT | Performed by: INTERNAL MEDICINE

## 2023-07-12 NOTE — H&P
Hospital Medicine History & Physical      PCP: No primary care provider on file. Date of Admission: 6/12/2021    Date of Service: Pt seen/examined on 6/13/2021    Chief Complaint:  HTN and EtOH intoxication     History Of Present Illness: The patient is a 28 y.o. female with EtOH abuse, HTN who presented to [facility] with complaint of HTN and alcohol intoxication. Patient was seen and evaluated in the ED by the ED medical provider, patient was medically cleared for admission to [facility]. This note serves as an admission medical H&P.    PCP: No primary care provider on file. Tobacco use: denies  ETOH use: yes, occasionally   Illicit drug use: + MJ    Patient denies any medical complaints. Past Medical History:        Diagnosis Date    Alcohol abuse 6/13/2021    Anemia     Dysfunctional uterine bleeding 11/26/2013    Hx of gallstones 9/2012    Hypertension     Irregular uterine bleeding        Past Surgical History:        Procedure Laterality Date    DILATION AND CURETTAGE OF UTERUS  2004       Medications Prior to Admission:    Prior to Admission medications    Medication Sig Start Date End Date Taking? Authorizing Provider   cloNIDine (CATAPRES) 0.1 MG tablet Take 1 tablet by mouth 3 times daily 6/12/21   DORCAS Clay CNP   ARIPiprazole (ABILIFY) 10 MG tablet Take 1 tablet by mouth daily 6/13/21   DORCAS Clay CNP   metoprolol tartrate (LOPRESSOR) 25 MG tablet Take 1 tablet by mouth 2 times daily 6/12/21   DORCAS Clay CNP   ferrous sulfate (HOLLIE-LORY) 325 (65 FE) MG tablet Take 1 tablet by mouth 2 times daily. 8/16/14   Tosha Thomas MD   acetaminophen (TYLENOL) 325 MG tablet Take 1 tablet by mouth every 6 hours as needed for Pain. 6/19/14   Florin Wilde MD       Allergies:  Haloperidol and related and Pineapple    Social History:  The patient currently lives at home    TOBACCO:   reports that she has been smoking cigarettes.  She has a 1.50 pack-year smoking history. She has never used smokeless tobacco.  ETOH:   reports no history of alcohol use. Family History:   Positive as follows:        Problem Relation Age of Onset    High Blood Pressure Mother     High Blood Pressure Sister     Rheum Arthritis Neg Hx     Osteoarthritis Neg Hx     Asthma Neg Hx     Breast Cancer Neg Hx     Cancer Neg Hx     Diabetes Neg Hx     Heart Failure Neg Hx     High Cholesterol Neg Hx     Hypertension Neg Hx     Migraines Neg Hx     Ovarian Cancer Neg Hx     Rashes/Skin Problems Neg Hx     Seizures Neg Hx     Stroke Neg Hx     Thyroid Disease Neg Hx        REVIEW OF SYSTEMS:       Constitutional: Negative for fever   HENT: Negative for sore throat   Eyes: Negative for redness   Respiratory: Negative  for dyspnea, cough   Cardiovascular: Negative for chest pain   Gastrointestinal: Negative for vomiting, diarrhea   Genitourinary: Negative for hematuria   Musculoskeletal: Negative for arthralgias   Skin: Negative for rash   Neurological: Negative for syncope    Hematological: Negative for easy bruising/bleeding   Psychiatric/Behavorial: Per psychiatry team evaluation     PHYSICAL EXAM:    BP (!) 152/102   Pulse 70   Temp 97.5 °F (36.4 °C) (Temporal)   Resp 16   Ht 5' 7\" (1.702 m)   Wt 188 lb 8 oz (85.5 kg)   SpO2 98%   BMI 29.52 kg/m²   Gen: No distress. Alert. Eyes:  No conjunctival injection. ENT: No discharge. Pharynx clear. Neck:  Trachea midline. Resp: No accessory muscle use. No crackles. No wheezes. No rhonchi. CV: Regular rate. Regular rhythm. No murmur. No rub. No edema. GI: Non-tender. Non-distended. Normal bowel sounds. Skin: Warm and dry. M/S: No cyanosis. No joint deformity. No clubbing. Neuro: Awake. No focal neurologic deficit on exam.  Cranial nerves II through XII intact. Patient is able to ambulate without difficulty.   Psych: Per psychiatry team evaluation     CBC:   Recent Labs     06/11/21  0430 WBC 6.8   HGB 11.5*   HCT 34.9*   MCV 83.0        BMP:   Recent Labs     06/11/21  0430   *   K 3.5      CO2 24   BUN 9   CREATININE 0.7     CULTURES  COVID-19: not detected   Urine Cx: Negative     EKG:  I have reviewed the EKG with the following interpretation:   NSR, normal axis, normal interval, no acute ST segment changes concerning for acute ischemia     RADIOLOGY  No orders to display       Pertinent previous results reviewed   None     ASSESSMENT/PLAN:  Bipolar DO  - per psychiatry team    HTN  - BP is uncontrolled   - No CP or SOB  - Continue clonidine, was started on BB during medical admission   - Reports edema to BLE, will add HCTZ as well and monitor     EtOH Abuse  - reports she does not drink daily   - Was acute intoxicated on admission to the medical floor   - No active signs of withdrawal      Marijuana Use  - recommend cessation   - +UDS     Pt has no medical complaints at this time. Pt was informed that they may have [facility] contact us should any medical concerns arise during this admission.     Sharmila Ozuna PA-C  6/13/2021 12:03 PM [facility]:  I discussed with the patient the risks of golimumab including but not limited to myelosuppression, immunosuppression, autoimmune hepatitis, demyelinating diseases, lymphoma, and serious infections.  The patient understands that monitoring is required including a PPD at baseline and must alert us or the primary physician if symptoms of infection or other concerning signs are noted.